# Patient Record
Sex: FEMALE | Race: WHITE | NOT HISPANIC OR LATINO | Employment: FULL TIME | URBAN - METROPOLITAN AREA
[De-identification: names, ages, dates, MRNs, and addresses within clinical notes are randomized per-mention and may not be internally consistent; named-entity substitution may affect disease eponyms.]

---

## 2018-03-16 ENCOUNTER — OFFICE VISIT (OUTPATIENT)
Dept: URGENT CARE | Facility: CLINIC | Age: 23
End: 2018-03-16
Payer: COMMERCIAL

## 2018-03-16 VITALS
SYSTOLIC BLOOD PRESSURE: 129 MMHG | HEART RATE: 86 BPM | OXYGEN SATURATION: 100 % | HEIGHT: 65 IN | RESPIRATION RATE: 16 BRPM | BODY MASS INDEX: 24.99 KG/M2 | WEIGHT: 150 LBS | DIASTOLIC BLOOD PRESSURE: 71 MMHG | TEMPERATURE: 97 F

## 2018-03-16 DIAGNOSIS — H66.001 ACUTE SUPPURATIVE OTITIS MEDIA OF RIGHT EAR WITHOUT SPONTANEOUS RUPTURE OF TYMPANIC MEMBRANE, RECURRENCE NOT SPECIFIED: Primary | ICD-10-CM

## 2018-03-16 PROCEDURE — 99203 OFFICE O/P NEW LOW 30 MIN: CPT | Mod: S$GLB,,, | Performed by: FAMILY MEDICINE

## 2018-03-16 RX ORDER — FLUTICASONE PROPIONATE 50 MCG
1 SPRAY, SUSPENSION (ML) NASAL 2 TIMES DAILY
Qty: 1 BOTTLE | Refills: 2 | Status: ON HOLD | OUTPATIENT
Start: 2018-03-16 | End: 2019-11-06

## 2018-03-16 RX ORDER — AMOXICILLIN AND CLAVULANATE POTASSIUM 875; 125 MG/1; MG/1
1 TABLET, FILM COATED ORAL 2 TIMES DAILY
Qty: 20 TABLET | Refills: 0 | Status: SHIPPED | OUTPATIENT
Start: 2018-03-16 | End: 2018-03-26

## 2018-03-16 NOTE — PROGRESS NOTES
"Subjective:       Patient ID: Tavo Linn is a 22 y.o. female.    Vitals:  height is 5' 5" (1.651 m) and weight is 68 kg (150 lb). Her temperature is 97.3 °F (36.3 °C). Her blood pressure is 129/71 and her pulse is 86. Her respiration is 16 and oxygen saturation is 100%.     Chief Complaint: Sinus Problem; Nasal Congestion; and Sore Throat    Sinus Problem   This is a new problem. The current episode started in the past 7 days. The problem has been gradually worsening since onset. Associated symptoms include congestion and a sore throat. Pertinent negatives include no chills, coughing, ear pain, headaches, hoarse voice or shortness of breath.   Sore Throat    Associated symptoms include congestion. Pertinent negatives include no abdominal pain, coughing, ear pain, headaches, hoarse voice or shortness of breath.     Review of Systems   Constitution: Negative for chills, fever and malaise/fatigue.   HENT: Positive for congestion and sore throat. Negative for ear pain and hoarse voice.    Eyes: Negative for discharge and redness.   Cardiovascular: Negative for chest pain, dyspnea on exertion and leg swelling.   Respiratory: Negative for cough, shortness of breath, sputum production and wheezing.    Musculoskeletal: Negative for myalgias.   Gastrointestinal: Negative for abdominal pain and nausea.   Neurological: Negative for headaches.       Objective:      Physical Exam   Constitutional: She is oriented to person, place, and time. She appears well-developed and well-nourished. She is cooperative.  Non-toxic appearance. She does not appear ill. No distress.   HENT:   Head: Normocephalic and atraumatic.   Right Ear: External ear and ear canal normal. Tympanic membrane is bulging. A middle ear effusion is present. Decreased hearing is noted.   Left Ear: Hearing, tympanic membrane, external ear and ear canal normal.   Nose: Nose normal. No mucosal edema, rhinorrhea or nasal deformity. No epistaxis. Right sinus " exhibits no maxillary sinus tenderness and no frontal sinus tenderness. Left sinus exhibits no maxillary sinus tenderness and no frontal sinus tenderness.   Mouth/Throat: Uvula is midline, oropharynx is clear and moist and mucous membranes are normal. No trismus in the jaw. Normal dentition. No uvula swelling. No posterior oropharyngeal erythema.   Eyes: Conjunctivae and lids are normal. No scleral icterus.   Sclera clear bilat   Neck: Trachea normal, full passive range of motion without pain and phonation normal. Neck supple.   Cardiovascular: Normal rate, regular rhythm, normal heart sounds, intact distal pulses and normal pulses.    Pulmonary/Chest: Effort normal and breath sounds normal. No respiratory distress.   Abdominal: Soft. Normal appearance and bowel sounds are normal. She exhibits no distension. There is no tenderness.   Musculoskeletal: Normal range of motion. She exhibits no edema or deformity.   Neurological: She is alert and oriented to person, place, and time. She exhibits normal muscle tone. Coordination normal.   Skin: Skin is warm, dry and intact. She is not diaphoretic. No pallor.   Psychiatric: She has a normal mood and affect. Her speech is normal and behavior is normal. Judgment and thought content normal. Cognition and memory are normal.   Nursing note and vitals reviewed.      Assessment:       1. Acute suppurative otitis media of right ear without spontaneous rupture of tympanic membrane, recurrence not specified        Plan:         Acute suppurative otitis media of right ear without spontaneous rupture of tympanic membrane, recurrence not specified  -     Ambulatory referral to ENT    Other orders  -     amoxicillin-clavulanate 875-125mg (AUGMENTIN) 875-125 mg per tablet; Take 1 tablet by mouth 2 (two) times daily.  Dispense: 20 tablet; Refill: 0  -     fluticasone (FLONASE) 50 mcg/actuation nasal spray; 1 spray (50 mcg total) by Each Nare route 2 (two) times daily.  Dispense: 1 Bottle;  Refill: 2

## 2018-03-22 ENCOUNTER — TELEPHONE (OUTPATIENT)
Dept: URGENT CARE | Facility: CLINIC | Age: 23
End: 2018-03-22

## 2018-04-03 ENCOUNTER — TELEPHONE (OUTPATIENT)
Dept: URGENT CARE | Facility: CLINIC | Age: 23
End: 2018-04-03

## 2018-04-03 NOTE — TELEPHONE ENCOUNTER
Pt stated was not told of follow up appt with ENT, verified and given number to ENT that has been scheduled

## 2018-04-10 DIAGNOSIS — H65.00 ACUTE SEROUS OTITIS MEDIA, RECURRENCE NOT SPECIFIED, UNSPECIFIED LATERALITY: Primary | ICD-10-CM

## 2018-04-11 ENCOUNTER — TELEPHONE (OUTPATIENT)
Dept: OTOLARYNGOLOGY | Facility: CLINIC | Age: 23
End: 2018-04-11

## 2018-04-11 ENCOUNTER — OFFICE VISIT (OUTPATIENT)
Dept: OTOLARYNGOLOGY | Facility: CLINIC | Age: 23
End: 2018-04-11
Payer: COMMERCIAL

## 2018-04-11 ENCOUNTER — CLINICAL SUPPORT (OUTPATIENT)
Dept: AUDIOLOGY | Facility: CLINIC | Age: 23
End: 2018-04-11
Payer: MEDICAID

## 2018-04-11 VITALS — WEIGHT: 128.31 LBS | BODY MASS INDEX: 21.38 KG/M2 | HEIGHT: 65 IN

## 2018-04-11 DIAGNOSIS — H90.11 CONDUCTIVE HEARING LOSS OF RIGHT EAR WITH UNRESTRICTED HEARING OF LEFT EAR: Primary | ICD-10-CM

## 2018-04-11 DIAGNOSIS — H72.91 PERFORATION OF RIGHT TYMPANIC MEMBRANE: ICD-10-CM

## 2018-04-11 DIAGNOSIS — H92.11 PURULENT OTORRHEA, RIGHT: Primary | ICD-10-CM

## 2018-04-11 DIAGNOSIS — H90.11 CONDUCTIVE HEARING LOSS OF RIGHT EAR WITH UNRESTRICTED HEARING OF LEFT EAR: ICD-10-CM

## 2018-04-11 DIAGNOSIS — H71.91 CHOLESTEATOMA, RIGHT: ICD-10-CM

## 2018-04-11 PROCEDURE — 87077 CULTURE AEROBIC IDENTIFY: CPT

## 2018-04-11 PROCEDURE — 99999 PR PBB SHADOW E&M-EST. PATIENT-LVL I: CPT | Mod: PBBFAC,,,

## 2018-04-11 PROCEDURE — 99999 PR PBB SHADOW E&M-EST. PATIENT-LVL III: CPT | Mod: PBBFAC,,, | Performed by: NURSE PRACTITIONER

## 2018-04-11 PROCEDURE — 99213 OFFICE O/P EST LOW 20 MIN: CPT | Mod: PBBFAC,25,27,PO | Performed by: NURSE PRACTITIONER

## 2018-04-11 PROCEDURE — G0268 REMOVAL OF IMPACTED WAX MD: HCPCS | Mod: PBBFAC,PO,RT | Performed by: NURSE PRACTITIONER

## 2018-04-11 PROCEDURE — 69210 REMOVE IMPACTED EAR WAX UNI: CPT | Mod: S$PBB,,, | Performed by: NURSE PRACTITIONER

## 2018-04-11 PROCEDURE — 92567 TYMPANOMETRY: CPT | Mod: PBBFAC,PO | Performed by: AUDIOLOGIST

## 2018-04-11 PROCEDURE — 87070 CULTURE OTHR SPECIMN AEROBIC: CPT

## 2018-04-11 PROCEDURE — 87186 SC STD MICRODIL/AGAR DIL: CPT

## 2018-04-11 PROCEDURE — 99211 OFF/OP EST MAY X REQ PHY/QHP: CPT | Mod: PBBFAC,PO,25

## 2018-04-11 PROCEDURE — 99203 OFFICE O/P NEW LOW 30 MIN: CPT | Mod: 25,S$PBB,, | Performed by: NURSE PRACTITIONER

## 2018-04-11 PROCEDURE — 92557 COMPREHENSIVE HEARING TEST: CPT | Mod: PBBFAC,PO | Performed by: AUDIOLOGIST

## 2018-04-11 RX ORDER — CIPROFLOXACIN HYDROCHLORIDE 3 MG/ML
SOLUTION/ DROPS OPHTHALMIC
Qty: 10 ML | Refills: 0 | Status: SHIPPED | OUTPATIENT
Start: 2018-04-11 | End: 2018-05-03

## 2018-04-11 RX ORDER — CIPROFLOXACIN AND DEXAMETHASONE 3; 1 MG/ML; MG/ML
5 SUSPENSION/ DROPS AURICULAR (OTIC) 2 TIMES DAILY
Qty: 7.5 ML | Refills: 0 | Status: SHIPPED | OUTPATIENT
Start: 2018-04-11 | End: 2018-04-11

## 2018-04-11 NOTE — LETTER
April 11, 2018      Ryan Miranda MD  [address]  [facility] [address]           [place] - ENT  [address] [facility] [address] [place] [address]  Phone: [phone]  Fax: [phone]          Patient: Tavo Linn   MR Number: [id number]   YOB: 1995   Date of Visit: 4/11/2018       Dear Dr. Ryan Miranda:    Thank you for referring Tavo Linn to me for evaluation. Attached you will find relevant portions of my assessment and plan of care.    If you have questions, please do not hesitate to call me. I look forward to following Tavo Linn along with you.    Sincerely,    Emma Shi, SLY    Enclosure  CC:  No Recipients    If you would like to receive this communication electronically, please contact [email or web address] or [phone] to request more information on [employer] access.    For providers and/or their staff who would like to refer a patient to [facility], please contact us through our one-stop-shop provider referral line, [facility], at [phone].    If you feel you have received this communication in error or would no longer like to receive these types of communications, please e-mail [email or web address]

## 2018-04-11 NOTE — PROGRESS NOTES
Subjective:       Patient ID: Tavo Linn is a 22 y.o. female.    Chief Complaint: Otitis Media (rt ear) and Ear Drainage    HPI   Patient experienced rupture of right TM at age 5. Tympanoplasty done by Dr. Lewis García in [place] in 2000, unsuccessful. Patient states her right ear has been bleeding with malodorous discolored drainage ever since, X 18 years, continuous. She states she does not hear well AD. She has been treated with numerous antibiotics, both oral and ototopical, without resolution. She has had no further ear surgeries. It does not hurt, just odor and drainage.     Review of Systems   Constitutional: Negative.    HENT: Positive for ear discharge and hearing loss. Negative for ear pain.    Eyes: Negative.    Respiratory: Negative.    Cardiovascular: Negative.    Gastrointestinal: Negative.    Musculoskeletal: Negative.    Skin: Negative.    Neurological: Negative.    Hematological: Negative.    Psychiatric/Behavioral: Negative.        Objective:      Physical Exam   Constitutional: She is oriented to person, place, and time. Vital signs are normal. She appears well-developed and well-nourished. She is cooperative. She does not appear ill. No distress.   HENT:   Head: Normocephalic and atraumatic.   Right Ear: External ear and ear canal normal. There is drainage, swelling and tenderness. Tympanic membrane is perforated and erythematous. A middle ear effusion is present. Decreased hearing is noted.   Left Ear: Hearing, tympanic membrane, external ear and ear canal normal. Tympanic membrane is not erythematous.  No middle ear effusion.   Ears:    Nose: Nose normal. No mucosal edema or rhinorrhea. Right sinus exhibits no maxillary sinus tenderness and no frontal sinus tenderness. Left sinus exhibits no maxillary sinus tenderness and no frontal sinus tenderness.   Mouth/Throat: Uvula is midline, oropharynx is clear and moist and mucous membranes are normal. Mucous membranes are not pale, not dry  and not cyanotic. No oral lesions. No oropharyngeal exudate, posterior oropharyngeal edema or posterior oropharyngeal erythema.     SEPARATE PROCEDURE IN OFFICE:   Procedure: Removal of impacted cerumen, RIGHT   Pre Procedure Diagnosis: Cerumen Impaction   Post Procedure Diagnosis: Cerumen Impaction   Verbal informed consent in regards to risk of trauma to ear canal, ear drum or hearing, discomfort during procedure and/or inability to remove cerumen impaction in one session or unforeseen events or complications.   No anesthesia.     Procedure in detail:   Ear canal visualized bilateral with appropriate size ear speculum utilizing Operating Head Binocular Otomicroscope   Utilizing the following: Suction cannula AD. The impacted cerumen of the ear canals was removed atraumatically. The TM and EAC were then inspected and found to be clear of wax. See description of TMs/EACs in PE above.   Complications: No   Condition: Improved/Good     Eyes: Conjunctivae, EOM and lids are normal. Pupils are equal, round, and reactive to light. Right eye exhibits no discharge. Left eye exhibits no discharge. No scleral icterus.   Neck: Trachea normal and normal range of motion. Neck supple. No tracheal deviation present. No thyroid mass and no thyromegaly present.   Cardiovascular: Normal rate.    Pulmonary/Chest: Effort normal. No stridor. No respiratory distress. She has no wheezes.   Musculoskeletal: Normal range of motion.   Lymphadenopathy:        Head (right side): No submental, no submandibular, no tonsillar, no preauricular and no posterior auricular adenopathy present.        Head (left side): No submental, no submandibular, no tonsillar, no preauricular and no posterior auricular adenopathy present.     She has no cervical adenopathy.        Right cervical: No superficial cervical and no posterior cervical adenopathy present.       Left cervical: No superficial cervical and no posterior cervical adenopathy present.    Neurological: She is alert and oriented to person, place, and time. She has normal strength. Coordination and gait normal.   Skin: Skin is warm, dry and intact. No lesion and no rash noted. She is not diaphoretic. No cyanosis. No pallor.   Psychiatric: She has a normal mood and affect. Her speech is normal and behavior is normal. Judgment and thought content normal. Cognition and memory are normal.   Nursing note and vitals reviewed.      Assessment:       1. Purulent otorrhea, right    2. Perforation of right tympanic membrane     3.  Cholesteatoma AD  4.  Moderate to moderately severe conductive hearing loss AD  Plan:    Ciprodex gtts AD BID X 2 weeks.      CT temporal bones  F/u with Dr. Rajan in 2 weeks after CT t-bone

## 2018-04-11 NOTE — TELEPHONE ENCOUNTER
----- Message from Sharifa Pastor sent at 4/11/2018 12:51 PM CDT -----  Please call mom, , in regards to ear drop Rx not being covered & they're to expensive, [phone]

## 2018-04-16 LAB — BACTERIA SPEC AEROBE CULT: NORMAL

## 2018-04-20 ENCOUNTER — TELEPHONE (OUTPATIENT)
Dept: OTOLARYNGOLOGY | Facility: CLINIC | Age: 23
End: 2018-04-20

## 2018-04-20 NOTE — TELEPHONE ENCOUNTER
----- Message from Emma Shi NP sent at 4/16/2018  3:21 PM CDT -----  MRSA growing from ear. The Ciprodex should help if done as demonstrated in ENT office, but call back if not getting better. Keep CT appt for next week and f/u with Dr. Rajan.

## 2018-04-25 ENCOUNTER — TELEPHONE (OUTPATIENT)
Dept: OTOLARYNGOLOGY | Facility: CLINIC | Age: 23
End: 2018-04-25

## 2018-04-25 ENCOUNTER — HOSPITAL ENCOUNTER (OUTPATIENT)
Dept: RADIOLOGY | Facility: HOSPITAL | Age: 23
Discharge: HOME | End: 2018-04-25
Attending: NURSE PRACTITIONER
Payer: MEDICAID

## 2018-04-25 ENCOUNTER — OFFICE VISIT (OUTPATIENT)
Dept: OTOLARYNGOLOGY | Facility: CLINIC | Age: 23
End: 2018-04-25
Payer: MEDICAID

## 2018-04-25 VITALS — BODY MASS INDEX: 22.02 KG/M2 | HEIGHT: 64 IN | WEIGHT: 129 LBS

## 2018-04-25 DIAGNOSIS — H71.91 CHOLESTEATOMA, RIGHT: ICD-10-CM

## 2018-04-25 DIAGNOSIS — H72.91 PERFORATION OF RIGHT TYMPANIC MEMBRANE: ICD-10-CM

## 2018-04-25 DIAGNOSIS — H72.91 PERFORATION OF RIGHT TYMPANIC MEMBRANE: Primary | ICD-10-CM

## 2018-04-25 DIAGNOSIS — H92.11 PURULENT OTORRHEA, RIGHT: ICD-10-CM

## 2018-04-25 PROCEDURE — 70480 CT ORBIT/EAR/FOSSA W/O DYE: CPT | Mod: 26,,, | Performed by: RADIOLOGY

## 2018-04-25 PROCEDURE — 99213 OFFICE O/P EST LOW 20 MIN: CPT | Mod: PBBFAC,25,PO | Performed by: OTOLARYNGOLOGY

## 2018-04-25 PROCEDURE — 70480 CT ORBIT/EAR/FOSSA W/O DYE: CPT | Mod: TC,PO

## 2018-04-25 PROCEDURE — 99999 PR PBB SHADOW E&M-EST. PATIENT-LVL III: CPT | Mod: PBBFAC,,, | Performed by: OTOLARYNGOLOGY

## 2018-04-25 PROCEDURE — 99214 OFFICE O/P EST MOD 30 MIN: CPT | Mod: S$PBB,,, | Performed by: OTOLARYNGOLOGY

## 2018-04-25 NOTE — PROGRESS NOTES
Subjective:       Patient ID: Tavo Linn is a 22 y.o. female.    Chief Complaint: review CT scan    Tavo is here for follow-up of right tm perforation. Otorrhea improved, hearing still down. No vertigo. No facial weakness.    Previous note by MB  Tympanoplasty done by Dr. Lewis García in [place] in 2000, unsuccessful. Patient states her right ear has been bleeding with malodorous discolored drainage ever since, X 18 years, continuous. She states she does not hear well AD. She has been treated with numerous antibiotics, both oral and ototopical, without resolution. She has had no further ear surgeries. It does not hurt, just odor and drainage.     Review of Systems   Constitutional: Negative for activity change and appetite change.   Respiratory: Negative for difficulty breathing and wheezing   Cardiovascular: Negative for chest pain.      Objective:        Constitutional:   Vital signs are normal. She appears well-developed and well-nourished.     Head:  Normocephalic and atraumatic.     Ears:  Left ear hearing normal to normal and whispered voice; external ear normal without scars, lesions, or masses; ear canal, tympanic membrane, and middle ear normal..   Right Ear: Tympanic membrane is perforated (near total). No middle ear effusion.   Cholesteatoma in superior mesotympanum / epitympanum .small mastoid cavity with normal sized ear canal.   No purulence  Inflammation of middle ear mucosa          Tests / Results:  I personally reviewed the Ct temporal bone and my findings reveal: mesotympanic / epitympanic thickening concerning for cholesteatoma, previous partial CWD mastoid vs. Bony overgrowth but Posterior canal is not intact laterally. FN ? Focal Dehiscence in middle ear. No involvement of SCC. No ossicles visualized.    Assessment:       1. Perforation of right tympanic membrane    2. Cholesteatoma, right          Plan:         Infection slightly improved but with concern for cholesteatoma. She  had a CWD or violated posterior canal wall in the past with inadequate meatoplasty. Discussed right CWD mastoidectomy with TM grafting and likely delayed OCR.     The diagnosis, details of the surgery, postoperative care, and options of treatment were discussed at length with the patient. The risks of surgery were discussed including but not limited to  infection, bleeding, recurrence, permanent deafness/hearing loss, disabling dizziness, taste disturbances, facial paralysis, need for additional surgery, and cardiopulmonary complications from anesthesia. I answered the patient's questions to satisfaction.

## 2018-04-25 NOTE — TELEPHONE ENCOUNTER
----- Message from Freya Bennett sent at 4/25/2018 11:03 AM CDT -----  Type: Needs Medical Advice    Who Called: pt  Symptoms (please be specific):  loulou  How long has patient had these symptoms: loulou  Pharmacy name and phone #:  loulou  Best Call Back Number [phone]  Additional Information: pt is  Calling to see if  Ear  Drops  Can  Be  Continued  Until    Surgery?

## 2018-05-03 ENCOUNTER — TELEPHONE (OUTPATIENT)
Dept: SURGERY | Facility: HOSPITAL | Age: 23
End: 2018-05-03

## 2018-05-03 ENCOUNTER — ANESTHESIA EVENT (OUTPATIENT)
Dept: SURGERY | Facility: HOSPITAL | Age: 23
End: 2018-05-03
Payer: MEDICAID

## 2018-05-04 ENCOUNTER — SURGERY (OUTPATIENT)
Age: 23
End: 2018-05-04

## 2018-05-04 ENCOUNTER — HOSPITAL ENCOUNTER (OUTPATIENT)
Facility: HOSPITAL | Age: 23
Discharge: HOME | End: 2018-05-04
Attending: OTOLARYNGOLOGY | Admitting: OTOLARYNGOLOGY
Payer: MEDICAID

## 2018-05-04 ENCOUNTER — ANESTHESIA (OUTPATIENT)
Dept: SURGERY | Facility: HOSPITAL | Age: 23
End: 2018-05-04
Payer: MEDICAID

## 2018-05-04 VITALS
SYSTOLIC BLOOD PRESSURE: 137 MMHG | RESPIRATION RATE: 14 BRPM | DIASTOLIC BLOOD PRESSURE: 82 MMHG | HEIGHT: 66 IN | HEART RATE: 75 BPM | BODY MASS INDEX: 20.89 KG/M2 | OXYGEN SATURATION: 99 % | WEIGHT: 130 LBS | TEMPERATURE: 97 F

## 2018-05-04 DIAGNOSIS — H71.91 CHOLESTEATOMA OF EAR, RIGHT: Primary | ICD-10-CM

## 2018-05-04 LAB
B-HCG UR QL: NEGATIVE
CTP QC/QA: YES

## 2018-05-04 PROCEDURE — 63600175 PHARM REV CODE 636 W HCPCS: Mod: PO | Performed by: ANESTHESIOLOGY

## 2018-05-04 PROCEDURE — 63600175 PHARM REV CODE 636 W HCPCS: Mod: PO | Performed by: NURSE ANESTHETIST, CERTIFIED REGISTERED

## 2018-05-04 PROCEDURE — 00300 ANES ALL PX INTEG H/N/PTRUNK: CPT | Mod: PO | Performed by: OTOLARYNGOLOGY

## 2018-05-04 PROCEDURE — S0028 INJECTION, FAMOTIDINE, 20 MG: HCPCS | Mod: PO | Performed by: NURSE ANESTHETIST, CERTIFIED REGISTERED

## 2018-05-04 PROCEDURE — 27201423 OPTIME MED/SURG SUP & DEVICES STERILE SUPPLY: Mod: PO | Performed by: OTOLARYNGOLOGY

## 2018-05-04 PROCEDURE — 25000003 PHARM REV CODE 250: Mod: PO | Performed by: ANESTHESIOLOGY

## 2018-05-04 PROCEDURE — 37000008 HC ANESTHESIA 1ST 15 MINUTES: Mod: PO | Performed by: OTOLARYNGOLOGY

## 2018-05-04 PROCEDURE — 81025 URINE PREGNANCY TEST: CPT | Mod: PO | Performed by: OTOLARYNGOLOGY

## 2018-05-04 PROCEDURE — 63600175 PHARM REV CODE 636 W HCPCS: Mod: PO | Performed by: OTOLARYNGOLOGY

## 2018-05-04 PROCEDURE — D9220A PRA ANESTHESIA: Mod: CRNA,,, | Performed by: NURSE ANESTHETIST, CERTIFIED REGISTERED

## 2018-05-04 PROCEDURE — 36000708 HC OR TIME LEV III 1ST 15 MIN: Mod: PO | Performed by: OTOLARYNGOLOGY

## 2018-05-04 PROCEDURE — 71000039 HC RECOVERY, EACH ADD'L HOUR: Mod: PO | Performed by: OTOLARYNGOLOGY

## 2018-05-04 PROCEDURE — 71000033 HC RECOVERY, INTIAL HOUR: Mod: PO | Performed by: OTOLARYNGOLOGY

## 2018-05-04 PROCEDURE — 88305 TISSUE EXAM BY PATHOLOGIST: CPT | Mod: 26,,, | Performed by: PATHOLOGY

## 2018-05-04 PROCEDURE — 88305 TISSUE EXAM BY PATHOLOGIST: CPT | Performed by: PATHOLOGY

## 2018-05-04 PROCEDURE — 25000003 PHARM REV CODE 250: Mod: PO | Performed by: NURSE ANESTHETIST, CERTIFIED REGISTERED

## 2018-05-04 PROCEDURE — 36000709 HC OR TIME LEV III EA ADD 15 MIN: Mod: PO | Performed by: OTOLARYNGOLOGY

## 2018-05-04 PROCEDURE — 37000009 HC ANESTHESIA EA ADD 15 MINS: Mod: PO | Performed by: OTOLARYNGOLOGY

## 2018-05-04 PROCEDURE — 69645 REVISE MIDDLE EAR & MASTOID: CPT | Mod: RT,,, | Performed by: OTOLARYNGOLOGY

## 2018-05-04 PROCEDURE — D9220A PRA ANESTHESIA: Mod: ANES,,, | Performed by: ANESTHESIOLOGY

## 2018-05-04 PROCEDURE — 25000003 PHARM REV CODE 250: Mod: PO | Performed by: OTOLARYNGOLOGY

## 2018-05-04 RX ORDER — SODIUM CHLORIDE 0.9 % (FLUSH) 0.9 %
3 SYRINGE (ML) INJECTION
Status: DISCONTINUED | OUTPATIENT
Start: 2018-05-04 | End: 2018-05-04 | Stop reason: HOSPADM

## 2018-05-04 RX ORDER — ONDANSETRON 4 MG/1
4 TABLET, ORALLY DISINTEGRATING ORAL EVERY 6 HOURS PRN
Qty: 10 TABLET | Refills: 0 | Status: ON HOLD | OUTPATIENT
Start: 2018-05-04 | End: 2018-10-22 | Stop reason: CLARIF

## 2018-05-04 RX ORDER — OXYCODONE HYDROCHLORIDE 5 MG/1
5 TABLET ORAL ONCE AS NEEDED
Status: COMPLETED | OUTPATIENT
Start: 2018-05-04 | End: 2018-05-04

## 2018-05-04 RX ORDER — CIPROFLOXACIN AND DEXAMETHASONE 3; 1 MG/ML; MG/ML
SUSPENSION/ DROPS AURICULAR (OTIC)
Status: DISCONTINUED | OUTPATIENT
Start: 2018-05-04 | End: 2018-05-04 | Stop reason: HOSPADM

## 2018-05-04 RX ORDER — DEXAMETHASONE SODIUM PHOSPHATE 4 MG/ML
8 INJECTION, SOLUTION INTRA-ARTICULAR; INTRALESIONAL; INTRAMUSCULAR; INTRAVENOUS; SOFT TISSUE
Status: COMPLETED | OUTPATIENT
Start: 2018-05-04 | End: 2018-05-04

## 2018-05-04 RX ORDER — LIDOCAINE HCL/PF 100 MG/5ML
SYRINGE (ML) INTRAVENOUS
Status: DISCONTINUED | OUTPATIENT
Start: 2018-05-04 | End: 2018-05-04

## 2018-05-04 RX ORDER — MIDAZOLAM HYDROCHLORIDE 1 MG/ML
INJECTION, SOLUTION INTRAMUSCULAR; INTRAVENOUS
Status: DISCONTINUED | OUTPATIENT
Start: 2018-05-04 | End: 2018-05-04

## 2018-05-04 RX ORDER — METOCLOPRAMIDE HYDROCHLORIDE 5 MG/ML
INJECTION INTRAMUSCULAR; INTRAVENOUS
Status: DISCONTINUED | OUTPATIENT
Start: 2018-05-04 | End: 2018-05-04

## 2018-05-04 RX ORDER — MUPIROCIN 20 MG/G
OINTMENT TOPICAL
Status: DISCONTINUED | OUTPATIENT
Start: 2018-05-04 | End: 2018-05-04 | Stop reason: HOSPADM

## 2018-05-04 RX ORDER — SCOLOPAMINE TRANSDERMAL SYSTEM 1 MG/1
1 PATCH, EXTENDED RELEASE TRANSDERMAL
Status: COMPLETED | OUTPATIENT
Start: 2018-05-04 | End: 2018-05-04

## 2018-05-04 RX ORDER — SODIUM CHLORIDE, SODIUM LACTATE, POTASSIUM CHLORIDE, CALCIUM CHLORIDE 600; 310; 30; 20 MG/100ML; MG/100ML; MG/100ML; MG/100ML
INJECTION, SOLUTION INTRAVENOUS CONTINUOUS
Status: DISCONTINUED | OUTPATIENT
Start: 2018-05-04 | End: 2018-05-04 | Stop reason: HOSPADM

## 2018-05-04 RX ORDER — OXYCODONE AND ACETAMINOPHEN 5; 325 MG/1; MG/1
1 TABLET ORAL EVERY 4 HOURS PRN
Qty: 20 TABLET | Refills: 0 | Status: ON HOLD | OUTPATIENT
Start: 2018-05-04 | End: 2018-10-22 | Stop reason: CLARIF

## 2018-05-04 RX ORDER — PROPOFOL 10 MG/ML
VIAL (ML) INTRAVENOUS
Status: DISCONTINUED | OUTPATIENT
Start: 2018-05-04 | End: 2018-05-04

## 2018-05-04 RX ORDER — LIDOCAINE HYDROCHLORIDE AND EPINEPHRINE 10; 10 MG/ML; UG/ML
INJECTION, SOLUTION INFILTRATION; PERINEURAL
Status: DISCONTINUED | OUTPATIENT
Start: 2018-05-04 | End: 2018-05-04 | Stop reason: HOSPADM

## 2018-05-04 RX ORDER — ONDANSETRON 2 MG/ML
4 INJECTION INTRAMUSCULAR; INTRAVENOUS DAILY PRN
Status: DISCONTINUED | OUTPATIENT
Start: 2018-05-04 | End: 2018-05-04 | Stop reason: HOSPADM

## 2018-05-04 RX ORDER — SUCCINYLCHOLINE CHLORIDE 20 MG/ML
INJECTION INTRAMUSCULAR; INTRAVENOUS
Status: DISCONTINUED | OUTPATIENT
Start: 2018-05-04 | End: 2018-05-04

## 2018-05-04 RX ORDER — CEFAZOLIN SODIUM 1 G/3ML
2 INJECTION, POWDER, FOR SOLUTION INTRAMUSCULAR; INTRAVENOUS
Status: DISCONTINUED | OUTPATIENT
Start: 2018-05-04 | End: 2018-05-04 | Stop reason: HOSPADM

## 2018-05-04 RX ORDER — FAMOTIDINE 10 MG/ML
INJECTION INTRAVENOUS
Status: DISCONTINUED | OUTPATIENT
Start: 2018-05-04 | End: 2018-05-04

## 2018-05-04 RX ORDER — FENTANYL CITRATE 50 UG/ML
25 INJECTION, SOLUTION INTRAMUSCULAR; INTRAVENOUS EVERY 5 MIN PRN
Status: DISCONTINUED | OUTPATIENT
Start: 2018-05-04 | End: 2018-05-04 | Stop reason: HOSPADM

## 2018-05-04 RX ORDER — CEFAZOLIN SODIUM 1 G/3ML
INJECTION, POWDER, FOR SOLUTION INTRAMUSCULAR; INTRAVENOUS
Status: DISCONTINUED | OUTPATIENT
Start: 2018-05-04 | End: 2018-05-04

## 2018-05-04 RX ORDER — LIDOCAINE HYDROCHLORIDE 10 MG/ML
0.5 INJECTION, SOLUTION EPIDURAL; INFILTRATION; INTRACAUDAL; PERINEURAL ONCE AS NEEDED
Status: DISCONTINUED | OUTPATIENT
Start: 2018-05-04 | End: 2018-05-04 | Stop reason: HOSPADM

## 2018-05-04 RX ORDER — ROCURONIUM BROMIDE 10 MG/ML
INJECTION, SOLUTION INTRAVENOUS
Status: DISCONTINUED | OUTPATIENT
Start: 2018-05-04 | End: 2018-05-04

## 2018-05-04 RX ORDER — BACITRACIN ZINC 500 UNIT/G
OINTMENT (GRAM) TOPICAL
Status: DISCONTINUED | OUTPATIENT
Start: 2018-05-04 | End: 2018-05-04 | Stop reason: HOSPADM

## 2018-05-04 RX ORDER — ACETAMINOPHEN 10 MG/ML
INJECTION, SOLUTION INTRAVENOUS
Status: DISCONTINUED | OUTPATIENT
Start: 2018-05-04 | End: 2018-05-04

## 2018-05-04 RX ORDER — ONDANSETRON 2 MG/ML
INJECTION INTRAMUSCULAR; INTRAVENOUS
Status: DISCONTINUED | OUTPATIENT
Start: 2018-05-04 | End: 2018-05-04

## 2018-05-04 RX ORDER — SCOLOPAMINE TRANSDERMAL SYSTEM 1 MG/1
1 PATCH, EXTENDED RELEASE TRANSDERMAL ONCE
Status: DISCONTINUED | OUTPATIENT
Start: 2018-05-04 | End: 2018-05-04

## 2018-05-04 RX ORDER — CEPHALEXIN 500 MG/1
500 CAPSULE ORAL EVERY 8 HOURS
Qty: 21 CAPSULE | Refills: 0 | Status: SHIPPED | OUTPATIENT
Start: 2018-05-04 | End: 2018-05-11

## 2018-05-04 RX ORDER — EPINEPHRINE 1 MG/ML
INJECTION INTRAMUSCULAR; INTRAVENOUS; SUBCUTANEOUS
Status: DISCONTINUED | OUTPATIENT
Start: 2018-05-04 | End: 2018-05-04 | Stop reason: HOSPADM

## 2018-05-04 RX ORDER — LORAZEPAM 2 MG/ML
0.25 INJECTION INTRAMUSCULAR
Status: DISCONTINUED | OUTPATIENT
Start: 2018-05-04 | End: 2018-05-04 | Stop reason: HOSPADM

## 2018-05-04 RX ORDER — FENTANYL CITRATE 50 UG/ML
INJECTION, SOLUTION INTRAMUSCULAR; INTRAVENOUS
Status: DISCONTINUED | OUTPATIENT
Start: 2018-05-04 | End: 2018-05-04

## 2018-05-04 RX ADMIN — FENTANYL CITRATE 25 MCG: 50 INJECTION, SOLUTION INTRAMUSCULAR; INTRAVENOUS at 09:05

## 2018-05-04 RX ADMIN — SODIUM CHLORIDE, SODIUM LACTATE, POTASSIUM CHLORIDE, CALCIUM CHLORIDE: 600; 310; 30; 20 INJECTION, SOLUTION INTRAVENOUS at 07:05

## 2018-05-04 RX ADMIN — FENTANYL CITRATE 50 MCG: 50 INJECTION, SOLUTION INTRAMUSCULAR; INTRAVENOUS at 07:05

## 2018-05-04 RX ADMIN — EPINEPHRINE 30 MG: 1 INJECTION, SOLUTION INTRAMUSCULAR; INTRAVENOUS; SUBCUTANEOUS at 08:05

## 2018-05-04 RX ADMIN — DEXAMETHASONE SODIUM PHOSPHATE 8 MG: 4 INJECTION, SOLUTION INTRAMUSCULAR; INTRAVENOUS at 07:05

## 2018-05-04 RX ADMIN — CEFAZOLIN 2 G: 1 INJECTION, POWDER, FOR SOLUTION INTRAVENOUS at 07:05

## 2018-05-04 RX ADMIN — LIDOCAINE HYDROCHLORIDE AND EPINEPHRINE 5 ML: 10; 10 INJECTION, SOLUTION INFILTRATION; PERINEURAL at 08:05

## 2018-05-04 RX ADMIN — GELATIN ABSORBABLE SPONGE 12-7 MM 1 EACH: 12-7 MISC at 08:05

## 2018-05-04 RX ADMIN — METOCLOPRAMIDE 10 MG: 5 INJECTION, SOLUTION INTRAMUSCULAR; INTRAVENOUS at 09:05

## 2018-05-04 RX ADMIN — PROPOFOL 200 MG: 10 INJECTION, EMULSION INTRAVENOUS at 07:05

## 2018-05-04 RX ADMIN — ROCURONIUM BROMIDE 10 MG: 10 INJECTION, SOLUTION INTRAVENOUS at 07:05

## 2018-05-04 RX ADMIN — CIPROFLOXACIN AND DEXAMETHASONE 4 DROP: 3; 1 SUSPENSION/ DROPS AURICULAR (OTIC) at 08:05

## 2018-05-04 RX ADMIN — LIDOCAINE HYDROCHLORIDE 80 MG: 20 INJECTION PARENTERAL at 07:05

## 2018-05-04 RX ADMIN — ONDANSETRON 4 MG: 2 INJECTION, SOLUTION INTRAMUSCULAR; INTRAVENOUS at 11:05

## 2018-05-04 RX ADMIN — ACETAMINOPHEN 1000 MG: 10 INJECTION, SOLUTION INTRAVENOUS at 07:05

## 2018-05-04 RX ADMIN — SUCCINYLCHOLINE CHLORIDE 120 MG: 20 INJECTION, SOLUTION INTRAMUSCULAR; INTRAVENOUS at 07:05

## 2018-05-04 RX ADMIN — FENTANYL CITRATE 50 MCG: 50 INJECTION, SOLUTION INTRAMUSCULAR; INTRAVENOUS at 10:05

## 2018-05-04 RX ADMIN — MUPIROCIN 1 TUBE: 20 OINTMENT TOPICAL at 10:05

## 2018-05-04 RX ADMIN — OXYCODONE HYDROCHLORIDE 5 MG: 5 TABLET ORAL at 01:05

## 2018-05-04 RX ADMIN — SODIUM CHLORIDE, SODIUM LACTATE, POTASSIUM CHLORIDE, CALCIUM CHLORIDE: 600; 310; 30; 20 INJECTION, SOLUTION INTRAVENOUS at 08:05

## 2018-05-04 RX ADMIN — SCOPALAMINE 1 PATCH: 1 PATCH, EXTENDED RELEASE TRANSDERMAL at 12:05

## 2018-05-04 RX ADMIN — MIDAZOLAM HYDROCHLORIDE 2 MG: 1 INJECTION, SOLUTION INTRAMUSCULAR; INTRAVENOUS at 07:05

## 2018-05-04 RX ADMIN — FAMOTIDINE 20 MG: 10 INJECTION INTRAVENOUS at 09:05

## 2018-05-04 RX ADMIN — PROMETHAZINE HYDROCHLORIDE 6.25 MG: 25 INJECTION INTRAMUSCULAR; INTRAVENOUS at 12:05

## 2018-05-04 NOTE — DISCHARGE INSTRUCTIONS
Postoperative Instructions  Chronic Ear SurgeryTympanoplasty, Tympanomastoidectomy, Mastoidectomy  Jamie Rajan MD  Otolarynology  [facility]  [phone]  Cell phone - (after hours / weekends) - [phone]    Leaving the Hospital   You will receive a prescription for pain medicine, an antibiotic, and sometimes an anti-nausea medicine.     Home Care-The First Few Days   First 24 hours after surgery:  o Remove all gauze from around the ear  o Keep the small yellow packing inside the ear until the follow-up. Call me if the pack falls out.  o You can clean the blood around the ear with hydrogen peroxide as needed  You will keep the white ear cover over the ear for 48 hours. OK to take off during shower and brief periods, but it will provide gentle pressure to the area. After 48 hours, you can continue to keep it on as needed for comfort.   It is normal to see blood-tinged or brown drainage from the ear for several days. I recommend buying some 4x4 gauze from a local drug store to replace inside the ear cover as needed.   If the drainage becomes yellow, green, or has a foul smelling odor, please call the office.   The ear canal will be filled with dissolvable packing, which should be left in place.   Do not clean the ear canal with cotton swabs. Clean the outer ear with a soft cloth or cotton swabs to remove dried blood, but if the ear is tender this is not necessary.   You may wash your hair 2 days after surgery but keep all water out of the ear canal.   The incision is closed with Steri Strips that will fall off on their own. No care is required for this.   Do not blow your nose for 4 weeks after surgery. Sniffing is okay.   You may fly 6 weeks following surgery. Whenever you fly, take an over the counter decongestant 30 minutes before take-off. Before the plane begins to descend, spray your nose with Neosynephrine or Afrin nasal spray. Use this procedure whenever you fly in the  future. You may use the same treatment when traveling by car in mountainous region.   No vigorous physical activity, including sports, until seen for your post-operative visit. With exception of these restrictions, you may return to work or school as overall condition permits. After 3 weeks you may resume all activities, including sports and physical exercise.   You may hear a variety of noises in your ear such as cracking or popping. This is part of the normal healing process.   Dizziness or lightheadedness is normal for up to 1 week after surgery.  Home Care- After the First Few Days   Drainage should begin to decrease and pain should also subside. Please continue taking Ibuprofen or Tylenol for relief of mild pain. It is normal for the top ½ of the ear to feel numb and this will take several months to return to normal.   There may be change in taste (usually described as metallic) on one side of the tongue.   All stitches are under the skin and will not need to be removed. After the Steri-strips are removed, the incision should be cleaned gently with peroxide once or twice daily until no cresting is noted. A thin layer of antibiotic ointment (Neosporin, Polysporin, Bacitracin, etc.) is helpful for 7 days after removal of Steri-strips.   Continue to keep all water out of the ear canal.  First Follow-up Appointment   We will set up your follow-up appointment in 1 week  Call office if:   Increased pain not relieved by prescription medications.   Large amounts of bleeding from the ear area.   Pus/Foul smelling drainage from the ear.   Redness in the ear area.   Temperature over 100° on 2 consecutive readings.   Severe dizziness.        Discharge Instructions: After Your Surgery  Youve just had surgery. During surgery, you were given medicine called anesthesia to keep you relaxed and free of pain. After surgery, you may have some pain or nausea. This is common. Here are some tips for feeling better and  getting well after surgery.     Stay on schedule with your medicine.   Going home  Your healthcare provider will show you how to take care of yourself when you go home. He or she will also answer your questions. Have an adult family member or friend drive you home. For the first 24 hours after your surgery:  · Do not drive or use heavy equipment.  · Do not make important decisions or sign legal papers.  · Do not drink alcohol.  · Have someone stay with you, if needed. He or she can watch for problems and help keep you safe.  Be sure to go to all follow-up visits with your healthcare provider. And rest after your surgery for as long as your healthcare provider tells you to.  Coping with pain  If you have pain after surgery, pain medicine will help you feel better. Take it as told, before pain becomes severe. Also, ask your healthcare provider or pharmacist about other ways to control pain. This might be with heat, ice, or relaxation. And follow any other instructions your surgeon or nurse gives you.  Tips for taking pain medicine  To get the best relief possible, remember these points:  · Pain medicines can upset your stomach. Taking them with a little food may help.  · Most pain relievers taken by mouth need at least 20 to 30 minutes to start to work.  · Taking medicine on a schedule can help you remember to take it. Try to time your medicine so that you can take it before starting an activity. This might be before you get dressed, go for a walk, or sit down for dinner.  · Constipation is a common side effect of pain medicines. Call your healthcare provider before taking any medicines such as laxatives or stool softeners to help ease constipation. Also ask if you should skip any foods. Drinking lots of fluids and eating foods such as fruits and vegetables that are high in fiber can also help. Remember, do not take laxatives unless your surgeon has prescribed them.  · Drinking alcohol and taking pain medicine can  cause dizziness and slow your breathing. It can even be deadly. Do not drink alcohol while taking pain medicine.  · Pain medicine can make you react more slowly to things. Do not drive or run machinery while taking pain medicine.  Your healthcare provider may tell you to take acetaminophen to help ease your pain. Ask him or her how much you are supposed to take each day. Acetaminophen or other pain relievers may interact with your prescription medicines or other over-the-counter (OTC) medicines. Some prescription medicines have acetaminophen and other ingredients. Using both prescription and OTC acetaminophen for pain can cause you to overdose. Read the labels on your OTC medicines with care. This will help you to clearly know the list of ingredients, how much to take, and any warnings. It may also help you not take too much acetaminophen. If you have questions or do not understand the information, ask your pharmacist or healthcare provider to explain it to you before you take the OTC medicine.  Managing nausea  Some people have an upset stomach after surgery. This is often because of anesthesia, pain, or pain medicine, or the stress of surgery. These tips will help you handle nausea and eat healthy foods as you get better. If you were on a special food plan before surgery, ask your healthcare provider if you should follow it while you get better. These tips may help:  · Do not push yourself to eat. Your body will tell you when to eat and how much.  · Start off with clear liquids and soup. They are easier to digest.  · Next try semi-solid foods, such as mashed potatoes, applesauce, and gelatin, as you feel ready.  · Slowly move to solid foods. Dont eat fatty, rich, or spicy foods at first.  · Do not force yourself to have 3 large meals a day. Instead eat smaller amounts more often.  · Take pain medicines with a small amount of solid food, such as crackers or toast, to avoid nausea.     Call your surgeon if  · You  still have pain an hour after taking medicine. The medicine may not be strong enough.  · You feel too sleepy, dizzy, or groggy. The medicine may be too strong.  · You have side effects like nausea, vomiting, or skin changes, such as rash, itching, or hives.       If you have obstructive sleep apnea  You were given anesthesia medicine during surgery to keep you comfortable and free of pain. After surgery, you may have more apnea spells because of this medicine and other medicines you were given. The spells may last longer than usual.   At home:  · Keep using the continuous positive airway pressure (CPAP) device when you sleep. Unless your healthcare provider tells you not to, use it when you sleep, day or night. CPAP is a common device used to treat obstructive sleep apnea.  · Talk with your provider before taking any pain medicine, muscle relaxants, or sedatives. Your provider will tell you about the possible dangers of taking these medicines.  Date Last Reviewed: 12/1/2016  © 4345-8251 [email or web address] [address] [place] [address], [place] [address]. All rights reserved. This information is not intended as a substitute for professional medical care. Always follow your healthcare professional's instructions.

## 2018-05-04 NOTE — PLAN OF CARE
Patient tolerating oral liquids and crackers without difficulty. No apparent s&s of distress noted at this time, states pain better after pain meds given and denies nausea. [place] dressing C,D,I to right ear.  Discharge instructions reviewed with patient/family/friend with good verbal feedback received. Patient ready for discharge

## 2018-05-04 NOTE — ANESTHESIA POSTPROCEDURE EVALUATION
"Anesthesia Post Evaluation    Patient: Tavo Linn    Procedure(s) Performed: Procedure(s) (LRB):  Tympanoplasty / Mastoidectomy (Right)    Final Anesthesia Type: general  Patient location during evaluation: PACU  Patient participation: Yes- Able to Participate  Level of consciousness: awake and alert  Post-procedure vital signs: reviewed and stable  Pain management: adequate  Airway patency: patent  PONV status at discharge: nausea (controlled)  Anesthetic complications: no      Cardiovascular status: hemodynamically stable and blood pressure returned to baseline  Respiratory status: unassisted, spontaneous ventilation and room air  Hydration status: euvolemic  Follow-up not needed.        Visit Vitals  /82   Pulse 75   Temp 36.3 °C (97.3 °F)   Resp 14   Ht 5' 6" (1.676 m)   Wt 59 kg (130 lb)   LMP 04/25/2018   SpO2 99%   Breastfeeding? No   BMI 20.98 kg/m²       Pain/Huy Score: Pain Assessment Performed: Yes (5/4/2018 11:50 AM)  Presence of Pain: complains of pain/discomfort (5/4/2018  1:15 PM)  Pain Rating Prior to Med Admin: 6 (5/4/2018  1:14 PM)  Huy Score: 10 (5/4/2018  1:15 PM)      "

## 2018-05-04 NOTE — ANESTHESIA PREPROCEDURE EVALUATION
05/04/2018  Tavo Linn is a 22 y.o., female.    Anesthesia Evaluation      I have reviewed the Medications.     Review of Systems  Anesthesia Hx:  No problems with previous Anesthesia   Social:  Non-Smoker, No Alcohol Use    Cardiovascular:  Cardiovascular Normal     Pulmonary:  Pulmonary Normal    Renal/:  Renal/ Normal     Hepatic/GI:  Hepatic/GI Normal    Neurological:  Neurology Normal    Endocrine:  Endocrine Normal        Physical Exam  General:  Well nourished    Airway/Jaw/Neck:  Airway Findings: Mouth Opening: Normal Tongue: Normal  General Airway Assessment: Adult, Average  Mallampati: II  Jaw/Neck Findings:  Neck ROM: Normal ROM       Chest/Lungs:  Chest/Lungs Findings: Clear to auscultation, Normal Respiratory Rate     Heart/Vascular:  Heart Findings: Rate: Normal  Rhythm: Regular Rhythm  Sounds: Normal  Heart murmur: negative       Mental Status:  Mental Status Findings:  Cooperative, Alert and Oriented         Anesthesia Plan  Type of Anesthesia, risks & benefits discussed:  Anesthesia Type:  general  Patient's Preference:   Intra-op Monitoring Plan:   Intra-op Monitoring Plan Comments:   Post Op Pain Control Plan:   Post Op Pain Control Plan Comments:   Induction:   IV  Beta Blocker:  Patient is not currently on a Beta-Blocker (No further documentation required).       Informed Consent: Patient understands risks and agrees with Anesthesia plan.  Questions answered. Anesthesia consent signed with patient.  ASA Score: 1     Day of Surgery Review of History & Physical:            Ready For Surgery From Anesthesia Perspective.

## 2018-05-04 NOTE — TRANSFER OF CARE
"Anesthesia Transfer of Care Note    Patient: Tavo Linn    Procedure(s) Performed: Procedure(s) (LRB):  Tympanoplasty / Mastoidectomy (Right)    Patient location: PACU    Anesthesia Type: general    Transport from OR: Transported from OR on room air with adequate spontaneous ventilation    Post pain: adequate analgesia    Post assessment: no apparent anesthetic complications    Post vital signs: stable    Level of consciousness: responds to stimulation    Nausea/Vomiting: no nausea/vomiting    Complications: none    Transfer of care protocol was followed      Last vitals:   Visit Vitals  BP (!) 136/59 (BP Location: Right leg, Patient Position: Lying)   Pulse 75   Temp 36.4 °C (97.5 °F) (Skin)   Resp 18   Ht 5' 6" (1.676 m)   Wt 59 kg (130 lb)   LMP 04/25/2018   SpO2 (!) 94%   Breastfeeding? No   BMI 20.98 kg/m²     "

## 2018-05-04 NOTE — BRIEF OP NOTE
[facility]  Brief Operative Note     SUMMARY     Surgery Date: 5/4/2018     Surgeon(s) and Role:     * Jamie Rajan MD - Primary    Assisting Surgeon: None    Pre-op Diagnosis:  Perforation of right tympanic membrane [H72.91]  Cholesteatoma, right [H71.91]    Post-op Diagnosis:  Post-Op Diagnosis Codes:     * Perforation of right tympanic membrane [H72.91]     * Cholesteatoma, right [H71.91]    Procedure(s) (LRB):  Tympanoplasty / Mastoidectomy (Right)    Anesthesia: General    Description of the findings of the procedure: revision CWD mastoid    Findings/Key Components: revision CWD mastoid    Estimated Blood Loss: 50 mL         Specimens:   Specimen (12h ago through future)    Start     Ordered    05/04/18 1112  Specimen to Pathology - Surgery  Once     Comments:  Pre-op Diagnosis: Perforation of right tympanic membrane [H72.91]Cholesteatoma, right [H71.91]Post-op Diagnosis: sameProcedure(s):Tympanoplasty / Mastoidectomy Number of specimens: 1Name of specimens: 1- right middle ear contents      05/04/18 1111          Discharge Note    SUMMARY     Admit Date: 5/4/2018    Discharge Date and Time:  05/04/2018 11:43 AM    Hospital Course (synopsis of major diagnoses, care, treatment, and services provided during the course of the hospital stay): Did well following surgery and was discharged uneventfully     Final Diagnosis: Post-Op Diagnosis Codes:     * Perforation of right tympanic membrane [H72.91]     * Cholesteatoma, right [H71.91]    Disposition: Home or Self Care    Follow Up/Patient Instructions: Regular diet, Follow-up 1 week. Activity light    Medications:  Reconciled Home Medications:   Current Discharge Medication List      CONTINUE these medications which have NOT CHANGED    Details   fluticasone (FLONASE) 50 mcg/actuation nasal spray 1 spray (50 mcg total) by Each Nare route 2 (two) times daily.  Qty: 1 Bottle, Refills: 2           No discharge procedures on file.  Follow-up  Information     Jamie Rajan MD In 1 week.    Specialty:  Otolaryngology  Why:  For wound re-check  Contact information:  [address] [place] [id number] [phone]

## 2018-05-04 NOTE — H&P
Subjective:       Patient ID: Tavo Linn is a 22 y.o. female.    Chief Complaint: No chief complaint on file.      Tavo is here for right cholesteatoma. No changes since clinic visit     Review of Systems   Constitutional: Negative for activity change and appetite change.   Eyes: Negative for discharge.   Respiratory: Negative for difficulty breathing and wheezing   Cardiovascular: Negative for chest pain.   Gastrointestinal: Negative for abdominal distention and abdominal pain.   Endocrine: Negative for cold intolerance and heat intolerance.   Genitourinary: Negative for dysuria.   Musculoskeletal: Negative for gait problem and joint swelling.   Skin: Negative for color change and pallor.   Neurological: Negative for syncope and weakness.   Psychiatric/Behavioral: Negative for agitation and confusion.     Objective:        Constitutional:   She is oriented to person, place, and time. She appears well-developed and well-nourished. She appears alert. She is active. Normal speech.      Head:  Normocephalic and atraumatic. Head is without TMJ tenderness. No scars. Salivary glands normal.  Facial strength is normal.      Ears:    Right Ear: There is drainage. No swelling. Tympanic membrane is perforated (cholesteatoma). No middle ear effusion. Decreased hearing is noted.   Left Ear: No drainage or swelling.  No middle ear effusion.     Nose:  No mucosal edema, rhinorrhea or sinus tenderness. No turbinate hypertrophy.      Mouth/Throat  Oropharynx clear and moist without lesions or asymmetry, normal uvula midline and mirror exam normal. Normal dentition. No uvula swelling, lacerations or trismus. No oropharyngeal exudate. Tonsillar erythema, tonsillar exudate.      Neck:  Full range of motion with neck supple and no adenopathy. Thyroid tenderness is present. No tracheal deviation, no edema, no erythema, normal range of motion, no stridor, no crepitus and no neck rigidity present. No thyroid mass present.      Cardiovascular:   Intact distal pulses and normal pulses.      Pulmonary/Chest:   Effort normal and breath sounds normal. No stridor.     Psychiatric:   Her speech is normal and behavior is normal. Her mood appears not anxious. Her affect is not labile.     Neurological:   She is alert and oriented to person, place, and time. No sensory deficit.     Skin:   No abrasions, lacerations, lesions, or rashes. No abrasion and no bruising noted.         Tests / Results:  Reviewed CT scan    Assessment:       1. Cholesteatoma of ear, right          Plan:         Revision right radical mastoidectomy as planned

## 2018-05-04 NOTE — OP NOTE
05/04/2018     Tavo Linn  [id number]  1995    PRE-OPERATIVE DIAGNOSIS  1. Cholesteatoma of ear, right         POST-OPERATIVE DIAGNOSIS  1. Cholesteatoma of ear, right         PROCEDURES PERFORMED  1. Revision right modified radical tympanomastoidectomy without ossicular chain reconstruction  2. Facial nerve monitoring for 3 hours    SURGEON: Jamie Rajan MD    ASSISTANT: None    ANESTHESIA: General    COMPLICATIONS: None    BLOOD LOSS: 50    SPECIMENS  1. Right middle ear /mastoid contents    DISPOSITION  PACU    OPERATIVE FINDINGS  1. Previous canal wall down mastoid with high facial ridge and incompletely drilled mastoid.   2. Cholesteatoma in epitympanum and superior mesotympanum, keratin overlying footplate along with granulation in sinus tympani  3. 30% perforation of tympanic membrane edge of tympanic membrane perforation was adherent to promontory and this was taken down  4. Revision canal wall down mastoidectomy performed, preservation of tegmen, sigmoid sinus.  5. Tympanic segment of facial nerve with small dehiscence just above footplate, remainder nicely encased in bone. Facial ridge lowered to level of nerve, which stimulated at conclusion  6. Silastic placed in middle ear for spacer. Temporalis fascia reconstruction of TM.  7. Ossicular chain: small residual stump of malleus within granulation, removed; incus and stapes suprastructure not present.  Footplate appeared intact but there was adheions on the footplate that I did not take down. The RWR was present.    INDICATIONS  Tavo Linn is a 22 y.o. female who was seen in clinic for evaluation of the above diagnosis. She had a previous cholesteatoma as a child and underwent mastoidectomy at a young age. She has had many years of chronic right sided otorrhea and hearing loss. I obtained a scan which revealed cholesteatoma. I discussed revision of her mastoidectomy. Based on clinical assessment, the following procedures were discussed as  an option for treatment. After risks, benefits, and alternatives were discussed the patient decided to proceed. Risks included: facial nerve injury, no improvement in hearing, taste changes. Vertigo, recurrence, need for long term cleaning, cosmetic change (meatoplasty), scar, and pain.     PROCEDURE IN DETAIL  The patient was seen in the pre-operative holding area, and consent was signed. They were wheeled into the operating room and placed supine on the table. Anesthesia was induced via general endotracheal tube. Continuous facial nerve monitoring electrodes were connected to orbicularis oris, oculi, and mentalis and noted to be in proper working order during the case.     The external canal was examined and noted to be normal caliber. Transcanal injections were performed with local anesthesia. The ear was cleaned. The perforation was noted as above. The margins of the perforation were freshened.     I made a post-auricular incision 1 cm behind the sulcus through previous scar and dissected through skin and subcutaneous tissue. I elevated the ear anteriorly toward to EAC. I harvested true temporalis fascia. I had to go more anterior than usual because there was no fascia available in the usual location. I did have to ligate a branch of superficial temporal. The fascia was set on the back table to dry. I created a Pavla flap incised along temporal line posteriorly then down to mastoid bone. Periosteal flap was elevated anteriorly to the previous mastoid cavity. Self retaining retractors were placed. I created my vascular strip incision and this was elevated out with a penrose. I visualized the tympanic membrane. I elevated the scar, granulation, and cholesteatoma from posterior to anterior. I began in the mastoid cavity and elevated into the epitympanum. I identified the horizontal canal. The facial ridge was high. I removed cholesteatoma and granulation from the epitympanum. I elevated the tympanomeatal flap off  the facial ridge to view the middle ear. There were adhesions which I took down. I elevated to the eustachian tube orifice. There was no cholesteatoma here.     I saucerized the mastoid cavity and drilled to the tegmen and sigmoid. I lowered the facial ridge to the level of the nerve. This was stimulated with bone overlying. Chorda tympani had been previously taken but I took down more the bone near this to view the sinus tympani. The was granulation tissue in the sinus tympani and I took some of this out. No cholesteatoma in the sinus tympani. I irrigated the cavity. There was a small stub of malleus against the tympanic membrane but this was not salvageable. The incus was previously taken out and the stapes supra structure was not present. A small portion of the tympanic facial nerve was dehiscent, just before the 2nd genu above the posterior aspect of the oval window.    I created a large meatoplasty by making superior and inferior incisions in the cartilagenous EAC. The posterior EAC laid nicely on the mastoid area. I placed a small amount of 0.5 mm silastic sheeting in the middle ear space over the oval window and promontory for spacer. I I placed the temporalis fascia medial to the perforation and it covered the entire area nicely. I draped it over the facial ridge. I ensured that no squamous tissue was beneath the graft. Gelfoam was placed lateral to the tympanic membrane for further support. The mastoid cavity was filled with antibiotic ointment.     The vascular strip was laid back down in native position. The Pavla flap was closed with Vicryl. The skin was closed with 4-0 Vicryl in deep dermal layer. Skin was closed with mastisol and steri strips. I confirmed good placement of the vascular strip incision in the canal. I placed a Xeroform pack in the EAC to keep the meatoplasty patent.    This marked the end of the procedure. The patient was turned back over to the Anesthesia team.  They were awoken and  transported back to the PACU in stable condition. Counts were correct. I performed the entire procedure.

## 2018-05-07 ENCOUNTER — TELEPHONE (OUTPATIENT)
Dept: OTOLARYNGOLOGY | Facility: CLINIC | Age: 23
End: 2018-05-07

## 2018-05-07 NOTE — TELEPHONE ENCOUNTER
----- Message from Jamie Rajan MD sent at 5/4/2018  6:48 AM CDT -----  1 week fu for wound check. Can be Thursday if needed.

## 2018-05-10 ENCOUNTER — OFFICE VISIT (OUTPATIENT)
Dept: OTOLARYNGOLOGY | Facility: CLINIC | Age: 23
End: 2018-05-10
Payer: COMMERCIAL

## 2018-05-10 VITALS — WEIGHT: 127.63 LBS | TEMPERATURE: 99 F | BODY MASS INDEX: 20.51 KG/M2 | HEIGHT: 66 IN

## 2018-05-10 DIAGNOSIS — H71.91 CHOLESTEATOMA OF EAR, RIGHT: Primary | ICD-10-CM

## 2018-05-10 PROCEDURE — 99999 PR PBB SHADOW E&M-EST. PATIENT-LVL III: CPT | Mod: PBBFAC,,, | Performed by: OTOLARYNGOLOGY

## 2018-05-10 PROCEDURE — 99024 POSTOP FOLLOW-UP VISIT: CPT | Mod: S$GLB,,, | Performed by: OTOLARYNGOLOGY

## 2018-05-11 ENCOUNTER — OFFICE VISIT (OUTPATIENT)
Dept: OTOLARYNGOLOGY | Facility: CLINIC | Age: 23
End: 2018-05-11
Payer: COMMERCIAL

## 2018-05-11 VITALS
HEIGHT: 66 IN | BODY MASS INDEX: 20.51 KG/M2 | WEIGHT: 127.63 LBS | DIASTOLIC BLOOD PRESSURE: 80 MMHG | SYSTOLIC BLOOD PRESSURE: 125 MMHG

## 2018-05-11 DIAGNOSIS — H72.91 PERFORATION OF RIGHT TYMPANIC MEMBRANE: ICD-10-CM

## 2018-05-11 DIAGNOSIS — H71.91 CHOLESTEATOMA OF EAR, RIGHT: Primary | ICD-10-CM

## 2018-05-11 PROCEDURE — 99999 PR PBB SHADOW E&M-EST. PATIENT-LVL III: CPT | Mod: PBBFAC,,, | Performed by: OTOLARYNGOLOGY

## 2018-05-11 PROCEDURE — 99024 POSTOP FOLLOW-UP VISIT: CPT | Mod: S$GLB,,, | Performed by: OTOLARYNGOLOGY

## 2018-05-11 NOTE — PROGRESS NOTES
Postoperative Note    Postoperative visit number 2 following right revision CWD mastoidectomy.   Pain: YES: worse than yesterday   Otorrhea: NO  Hearing Improvement:no    Physical Exam:  Incision: clean, dry, well approximated, appropriate, steri strips  Pinna:   EAC/Meatus: stent in place, removed. Not replaced. Large meatus, Clot and Gelfoam in place. Ciprodex applied.  Tympanic Membrane: CNE  Middle ear: CNE    Facial Nerve Function:  normal, full    Other:  Impression:  Doing well s/p CWD mastoid  Packing out  Ciprodex  Fu 1 week

## 2018-05-14 RX ORDER — CIPROFLOXACIN AND DEXAMETHASONE 3; 1 MG/ML; MG/ML
4 SUSPENSION/ DROPS AURICULAR (OTIC) 2 TIMES DAILY
Qty: 7.5 ML | Refills: 1 | Status: SHIPPED | OUTPATIENT
Start: 2018-05-14 | End: 2018-05-24

## 2018-05-17 ENCOUNTER — OFFICE VISIT (OUTPATIENT)
Dept: OTOLARYNGOLOGY | Facility: CLINIC | Age: 23
End: 2018-05-17
Payer: COMMERCIAL

## 2018-05-17 VITALS — BODY MASS INDEX: 20.62 KG/M2 | WEIGHT: 128.31 LBS | HEIGHT: 66 IN

## 2018-05-17 DIAGNOSIS — H90.11 CONDUCTIVE HEARING LOSS OF RIGHT EAR WITH UNRESTRICTED HEARING OF LEFT EAR: ICD-10-CM

## 2018-05-17 DIAGNOSIS — H71.91 CHOLESTEATOMA OF EAR, RIGHT: Primary | ICD-10-CM

## 2018-05-17 PROCEDURE — 99999 PR PBB SHADOW E&M-EST. PATIENT-LVL II: CPT | Mod: PBBFAC,,, | Performed by: OTOLARYNGOLOGY

## 2018-05-17 PROCEDURE — 99024 POSTOP FOLLOW-UP VISIT: CPT | Mod: S$GLB,,, | Performed by: OTOLARYNGOLOGY

## 2018-05-18 PROBLEM — H90.11 CONDUCTIVE HEARING LOSS OF RIGHT EAR WITH UNRESTRICTED HEARING OF LEFT EAR: Status: ACTIVE | Noted: 2018-05-18

## 2018-05-18 NOTE — PROGRESS NOTES
Postoperative Note    Postoperative visit number 3 following right revision CWD mastoidectomy.   Pain: No  Otorrhea: No  Hearing Improvement:no    Physical Exam:  Incision: clean, dry, well approximated  Pinna:   EAC/Meatus: meatus healing well, Clot and Gelfoam in place suctioned. Epithelializing appropriately.  Tympanic Membrane: CNE  Middle ear: CNE    Facial Nerve Function:  normal, full    Other:  Impression:  Doing well s/p CWD mastoid  Continue drops  Follow-up 2 weeks

## 2018-05-30 ENCOUNTER — OFFICE VISIT (OUTPATIENT)
Dept: OTOLARYNGOLOGY | Facility: CLINIC | Age: 23
End: 2018-05-30
Payer: COMMERCIAL

## 2018-05-30 VITALS — WEIGHT: 131.38 LBS | BODY MASS INDEX: 21.89 KG/M2 | HEIGHT: 65 IN

## 2018-05-30 DIAGNOSIS — H71.91 CHOLESTEATOMA OF EAR, RIGHT: Primary | ICD-10-CM

## 2018-05-30 DIAGNOSIS — H90.11 CONDUCTIVE HEARING LOSS OF RIGHT EAR WITH UNRESTRICTED HEARING OF LEFT EAR: ICD-10-CM

## 2018-05-30 PROCEDURE — 99024 POSTOP FOLLOW-UP VISIT: CPT | Mod: S$GLB,,, | Performed by: OTOLARYNGOLOGY

## 2018-05-30 PROCEDURE — 99999 PR PBB SHADOW E&M-EST. PATIENT-LVL II: CPT | Mod: PBBFAC,,, | Performed by: OTOLARYNGOLOGY

## 2018-05-31 NOTE — PROGRESS NOTES
Postoperative Note    Postoperative visit number 4 following right revision CWD mastoidectomy.   Pain: No  Otorrhea: No  Hearing Improvement:no    Physical Exam:  Incision: clean, dry, well approximated  Pinna:   EAC/Meatus: meatus healing well, Clot and Gelfoam in place suctioned. Epithelializing appropriately.  Tympanic Membrane: intact  Middle ear: CNE    Facial Nerve Function:  normal, full    Other:  Impression:  Healing well  Dry ear precuations  Follow-up 1 month

## 2018-06-28 ENCOUNTER — OFFICE VISIT (OUTPATIENT)
Dept: OTOLARYNGOLOGY | Facility: CLINIC | Age: 23
End: 2018-06-28
Payer: COMMERCIAL

## 2018-06-28 VITALS — HEIGHT: 65 IN | BODY MASS INDEX: 21.86 KG/M2 | WEIGHT: 131.19 LBS

## 2018-06-28 DIAGNOSIS — H90.11 CONDUCTIVE HEARING LOSS OF RIGHT EAR WITH UNRESTRICTED HEARING OF LEFT EAR: Primary | ICD-10-CM

## 2018-06-28 DIAGNOSIS — H71.91 CHOLESTEATOMA OF EAR, RIGHT: ICD-10-CM

## 2018-06-28 PROCEDURE — 99024 POSTOP FOLLOW-UP VISIT: CPT | Mod: S$GLB,,, | Performed by: OTOLARYNGOLOGY

## 2018-06-28 PROCEDURE — 99999 PR PBB SHADOW E&M-EST. PATIENT-LVL II: CPT | Mod: PBBFAC,,, | Performed by: OTOLARYNGOLOGY

## 2018-06-29 NOTE — PROGRESS NOTES
Postoperative Note    Postoperative visit number 5 following right revision CWD mastoidectomy 5/4/18  Pain: No  Otorrhea: No  Hearing Improvement:no    Physical Exam:  Incision: clean, dry, well approximated  Pinna:   EAC/Meatus: meatus healed, cavity well epithelialized.  Tympanic Membrane: intact  Middle ear: dry    Facial Nerve Function:  normal, full    Other:  Impression:  Healing well  Dry ear precuations  Follow-up 3 months with audio  Can consider 2nd stage OCR at that time. Discussed decreased hearing moving forward given canal is down

## 2018-10-08 ENCOUNTER — OFFICE VISIT (OUTPATIENT)
Dept: OTOLARYNGOLOGY | Facility: CLINIC | Age: 23
End: 2018-10-08
Payer: MEDICAID

## 2018-10-08 VITALS — HEIGHT: 65 IN | WEIGHT: 131.19 LBS | BODY MASS INDEX: 21.86 KG/M2

## 2018-10-08 DIAGNOSIS — H90.11 CONDUCTIVE HEARING LOSS OF RIGHT EAR WITH UNRESTRICTED HEARING OF LEFT EAR: Primary | ICD-10-CM

## 2018-10-08 DIAGNOSIS — Z98.890 S/P TYMPANOPLASTY: Primary | ICD-10-CM

## 2018-10-08 PROBLEM — H71.91 CHOLESTEATOMA OF EAR, RIGHT: Status: RESOLVED | Noted: 2018-05-04 | Resolved: 2018-10-08

## 2018-10-08 PROCEDURE — 99214 OFFICE O/P EST MOD 30 MIN: CPT | Mod: S$PBB,,, | Performed by: OTOLARYNGOLOGY

## 2018-10-08 PROCEDURE — 99999 PR PBB SHADOW E&M-EST. PATIENT-LVL III: CPT | Mod: PBBFAC,,, | Performed by: OTOLARYNGOLOGY

## 2018-10-08 PROCEDURE — 99213 OFFICE O/P EST LOW 20 MIN: CPT | Mod: PBBFAC,PO | Performed by: OTOLARYNGOLOGY

## 2018-10-09 NOTE — PROGRESS NOTES
Subjective:       Patient ID: Tavo Linn is a 23 y.o. female.    Chief Complaint: Follow-up    Tavo is here for follow-up of revision right CWD mastoidectomy for recurrent cholesteatoma. Doing well. No otorrhea. No improvement in hearing, as expected. No vertigo.    Review of Systems   Constitutional: Negative for activity change and appetite change.   Respiratory: Negative for difficulty breathing and wheezing   Cardiovascular: Negative for chest pain.      Objective:        Constitutional:   Vital signs are normal. She appears well-developed and well-nourished.     Head:  Normocephalic and atraumatic.     Ears:  Left ear hearing normal to normal and whispered voice; external ear normal without scars, lesions, or masses; ear canal, tympanic membrane, and middle ear normal..   Right:  CWD cavity, cleaned, facial ridge normal. Retraction of TM in mesotympanum but no disease visible.         Tests / Results:  Maximum CHL AD          Assessment:       1. Conductive hearing loss of right ear with unrestricted hearing of left ear          Plan:       No disease  Discussed options: obs vs. HA vs. OCR  She would like to proceed with surgery  Discussed risk on incomplete hearing improvement given CWD cavity. She understands.     The diagnosis, details of the surgery, postoperative care, and options of treatment were discussed at length with the patient. The risks of surgery were discussed including but not limited to  infection, bleeding, recurrence, permanent deafness/hearing loss, disabling dizziness, taste disturbances, facial paralysis, need for additional surgery, and cardiopulmonary complications from anesthesia. I answered the patient's questions to satisfaction.

## 2018-10-19 ENCOUNTER — ANESTHESIA EVENT (OUTPATIENT)
Dept: SURGERY | Facility: HOSPITAL | Age: 23
End: 2018-10-19
Payer: MEDICAID

## 2018-10-22 ENCOUNTER — ANESTHESIA (OUTPATIENT)
Dept: SURGERY | Facility: HOSPITAL | Age: 23
End: 2018-10-22
Payer: MEDICAID

## 2018-10-22 ENCOUNTER — HOSPITAL ENCOUNTER (OUTPATIENT)
Facility: HOSPITAL | Age: 23
Discharge: HOME | End: 2018-10-22
Attending: OTOLARYNGOLOGY | Admitting: OTOLARYNGOLOGY
Payer: MEDICAID

## 2018-10-22 DIAGNOSIS — H71.91 CHOLESTEATOMA OF RIGHT EAR: Primary | ICD-10-CM

## 2018-10-22 DIAGNOSIS — H90.11 CONDUCTIVE HEARING LOSS IN RIGHT EAR: ICD-10-CM

## 2018-10-22 DIAGNOSIS — H90.11 CONDUCTIVE HEARING LOSS OF RIGHT EAR WITH UNRESTRICTED HEARING OF LEFT EAR: ICD-10-CM

## 2018-10-22 LAB
B-HCG UR QL: NEGATIVE
CTP QC/QA: YES

## 2018-10-22 PROCEDURE — 63600175 PHARM REV CODE 636 W HCPCS: Mod: PO | Performed by: OTOLARYNGOLOGY

## 2018-10-22 PROCEDURE — 69633 REBUILD EARDRUM STRUCTURES: CPT | Mod: RT,,, | Performed by: OTOLARYNGOLOGY

## 2018-10-22 PROCEDURE — 71000033 HC RECOVERY, INTIAL HOUR: Mod: PO | Performed by: OTOLARYNGOLOGY

## 2018-10-22 PROCEDURE — L8613 OSSICULAR IMPLANT: HCPCS | Mod: PO | Performed by: OTOLARYNGOLOGY

## 2018-10-22 PROCEDURE — 25000003 PHARM REV CODE 250: Mod: PO | Performed by: ANESTHESIOLOGY

## 2018-10-22 PROCEDURE — 21235 EAR CARTILAGE GRAFT: CPT | Mod: 59,,, | Performed by: OTOLARYNGOLOGY

## 2018-10-22 PROCEDURE — 00120 ANES PX EAR W/BX NOS: CPT | Mod: PO | Performed by: OTOLARYNGOLOGY

## 2018-10-22 PROCEDURE — 27800903 OPTIME MED/SURG SUP & DEVICES OTHER IMPLANTS: Mod: PO | Performed by: OTOLARYNGOLOGY

## 2018-10-22 PROCEDURE — 37000009 HC ANESTHESIA EA ADD 15 MINS: Mod: PO | Performed by: OTOLARYNGOLOGY

## 2018-10-22 PROCEDURE — 63600175 PHARM REV CODE 636 W HCPCS: Mod: PO | Performed by: ANESTHESIOLOGY

## 2018-10-22 PROCEDURE — 25000003 PHARM REV CODE 250: Mod: PO | Performed by: NURSE ANESTHETIST, CERTIFIED REGISTERED

## 2018-10-22 PROCEDURE — 25000003 PHARM REV CODE 250: Mod: PO | Performed by: OTOLARYNGOLOGY

## 2018-10-22 PROCEDURE — 27201423 OPTIME MED/SURG SUP & DEVICES STERILE SUPPLY: Mod: PO | Performed by: OTOLARYNGOLOGY

## 2018-10-22 PROCEDURE — 71000039 HC RECOVERY, EACH ADD'L HOUR: Mod: PO | Performed by: OTOLARYNGOLOGY

## 2018-10-22 PROCEDURE — 36000709 HC OR TIME LEV III EA ADD 15 MIN: Mod: PO | Performed by: OTOLARYNGOLOGY

## 2018-10-22 PROCEDURE — 81025 URINE PREGNANCY TEST: CPT | Mod: PO | Performed by: OTOLARYNGOLOGY

## 2018-10-22 PROCEDURE — 36000708 HC OR TIME LEV III 1ST 15 MIN: Mod: PO | Performed by: OTOLARYNGOLOGY

## 2018-10-22 PROCEDURE — D9220A PRA ANESTHESIA: Mod: CRNA,,, | Performed by: NURSE ANESTHETIST, CERTIFIED REGISTERED

## 2018-10-22 PROCEDURE — D9220A PRA ANESTHESIA: Mod: ANES,,, | Performed by: ANESTHESIOLOGY

## 2018-10-22 PROCEDURE — 37000008 HC ANESTHESIA 1ST 15 MINUTES: Mod: PO | Performed by: OTOLARYNGOLOGY

## 2018-10-22 PROCEDURE — 63600175 PHARM REV CODE 636 W HCPCS: Mod: PO | Performed by: NURSE ANESTHETIST, CERTIFIED REGISTERED

## 2018-10-22 DEVICE — IMPLANTABLE DEVICE: Type: IMPLANTABLE DEVICE | Site: EAR | Status: FUNCTIONAL

## 2018-10-22 RX ORDER — DEXAMETHASONE SODIUM PHOSPHATE 4 MG/ML
8 INJECTION, SOLUTION INTRA-ARTICULAR; INTRALESIONAL; INTRAMUSCULAR; INTRAVENOUS; SOFT TISSUE
Status: COMPLETED | OUTPATIENT
Start: 2018-10-22 | End: 2018-10-22

## 2018-10-22 RX ORDER — SODIUM CHLORIDE 0.9 % (FLUSH) 0.9 %
3 SYRINGE (ML) INJECTION
Status: DISCONTINUED | OUTPATIENT
Start: 2018-10-22 | End: 2018-10-22 | Stop reason: HOSPADM

## 2018-10-22 RX ORDER — ROCURONIUM BROMIDE 10 MG/ML
INJECTION, SOLUTION INTRAVENOUS
Status: DISCONTINUED | OUTPATIENT
Start: 2018-10-22 | End: 2018-10-22

## 2018-10-22 RX ORDER — KETAMINE HYDROCHLORIDE 100 MG/ML
INJECTION, SOLUTION INTRAMUSCULAR; INTRAVENOUS
Status: DISCONTINUED | OUTPATIENT
Start: 2018-10-22 | End: 2018-10-22

## 2018-10-22 RX ORDER — CIPROFLOXACIN AND DEXAMETHASONE 3; 1 MG/ML; MG/ML
SUSPENSION/ DROPS AURICULAR (OTIC)
Status: DISCONTINUED | OUTPATIENT
Start: 2018-10-22 | End: 2018-10-22 | Stop reason: HOSPADM

## 2018-10-22 RX ORDER — EPINEPHRINE 1 MG/ML
INJECTION INTRAMUSCULAR; INTRAVENOUS; SUBCUTANEOUS
Status: DISCONTINUED | OUTPATIENT
Start: 2018-10-22 | End: 2018-10-22 | Stop reason: HOSPADM

## 2018-10-22 RX ORDER — LIDOCAINE HCL/PF 100 MG/5ML
SYRINGE (ML) INTRAVENOUS
Status: DISCONTINUED | OUTPATIENT
Start: 2018-10-22 | End: 2018-10-22

## 2018-10-22 RX ORDER — CEFAZOLIN SODIUM 2 G/50ML
2 SOLUTION INTRAVENOUS
Status: DISCONTINUED | OUTPATIENT
Start: 2018-10-22 | End: 2018-10-22 | Stop reason: HOSPADM

## 2018-10-22 RX ORDER — OXYCODONE HYDROCHLORIDE 5 MG/1
5 TABLET ORAL
Status: DISCONTINUED | OUTPATIENT
Start: 2018-10-22 | End: 2018-10-22 | Stop reason: HOSPADM

## 2018-10-22 RX ORDER — MIDAZOLAM HYDROCHLORIDE 1 MG/ML
INJECTION INTRAMUSCULAR; INTRAVENOUS
Status: DISCONTINUED | OUTPATIENT
Start: 2018-10-22 | End: 2018-10-22

## 2018-10-22 RX ORDER — LIDOCAINE HYDROCHLORIDE 10 MG/ML
1 INJECTION, SOLUTION EPIDURAL; INFILTRATION; INTRACAUDAL; PERINEURAL ONCE
Status: COMPLETED | OUTPATIENT
Start: 2018-10-22 | End: 2018-10-22

## 2018-10-22 RX ORDER — FENTANYL CITRATE 50 UG/ML
INJECTION, SOLUTION INTRAMUSCULAR; INTRAVENOUS
Status: DISCONTINUED | OUTPATIENT
Start: 2018-10-22 | End: 2018-10-22

## 2018-10-22 RX ORDER — HYDROMORPHONE HYDROCHLORIDE 2 MG/ML
0.2 INJECTION, SOLUTION INTRAMUSCULAR; INTRAVENOUS; SUBCUTANEOUS EVERY 5 MIN PRN
Status: DISCONTINUED | OUTPATIENT
Start: 2018-10-22 | End: 2018-10-22 | Stop reason: HOSPADM

## 2018-10-22 RX ORDER — ONDANSETRON 2 MG/ML
INJECTION INTRAMUSCULAR; INTRAVENOUS
Status: DISCONTINUED | OUTPATIENT
Start: 2018-10-22 | End: 2018-10-22

## 2018-10-22 RX ORDER — BACITRACIN ZINC 500 UNIT/G
OINTMENT (GRAM) TOPICAL
Status: DISCONTINUED | OUTPATIENT
Start: 2018-10-22 | End: 2018-10-22 | Stop reason: HOSPADM

## 2018-10-22 RX ORDER — SODIUM CHLORIDE, SODIUM LACTATE, POTASSIUM CHLORIDE, CALCIUM CHLORIDE 600; 310; 30; 20 MG/100ML; MG/100ML; MG/100ML; MG/100ML
INJECTION, SOLUTION INTRAVENOUS CONTINUOUS
Status: DISCONTINUED | OUTPATIENT
Start: 2018-10-22 | End: 2018-10-22 | Stop reason: HOSPADM

## 2018-10-22 RX ORDER — MEPERIDINE HYDROCHLORIDE 50 MG/ML
12.5 INJECTION INTRAMUSCULAR; INTRAVENOUS; SUBCUTANEOUS ONCE AS NEEDED
Status: DISCONTINUED | OUTPATIENT
Start: 2018-10-22 | End: 2018-10-22 | Stop reason: HOSPADM

## 2018-10-22 RX ORDER — GLYCOPYRROLATE 0.2 MG/ML
INJECTION INTRAMUSCULAR; INTRAVENOUS
Status: DISCONTINUED | OUTPATIENT
Start: 2018-10-22 | End: 2018-10-22

## 2018-10-22 RX ORDER — ONDANSETRON 4 MG/1
4 TABLET, ORALLY DISINTEGRATING ORAL EVERY 6 HOURS PRN
Qty: 10 TABLET | Refills: 0 | Status: ON HOLD | OUTPATIENT
Start: 2018-10-22 | End: 2019-11-06 | Stop reason: CLARIF

## 2018-10-22 RX ORDER — OXYCODONE AND ACETAMINOPHEN 5; 325 MG/1; MG/1
1 TABLET ORAL EVERY 4 HOURS PRN
Qty: 5 TABLET | Refills: 0 | Status: ON HOLD | OUTPATIENT
Start: 2018-10-22 | End: 2019-11-06 | Stop reason: CLARIF

## 2018-10-22 RX ORDER — LIDOCAINE HYDROCHLORIDE AND EPINEPHRINE 10; 10 MG/ML; UG/ML
INJECTION, SOLUTION INFILTRATION; PERINEURAL
Status: DISCONTINUED | OUTPATIENT
Start: 2018-10-22 | End: 2018-10-22 | Stop reason: HOSPADM

## 2018-10-22 RX ORDER — PROPOFOL 10 MG/ML
VIAL (ML) INTRAVENOUS
Status: DISCONTINUED | OUTPATIENT
Start: 2018-10-22 | End: 2018-10-22

## 2018-10-22 RX ORDER — FENTANYL CITRATE 50 UG/ML
25 INJECTION, SOLUTION INTRAMUSCULAR; INTRAVENOUS EVERY 5 MIN PRN
Status: DISCONTINUED | OUTPATIENT
Start: 2018-10-22 | End: 2018-10-22 | Stop reason: HOSPADM

## 2018-10-22 RX ORDER — SUCCINYLCHOLINE CHLORIDE 20 MG/ML
INJECTION INTRAMUSCULAR; INTRAVENOUS
Status: DISCONTINUED | OUTPATIENT
Start: 2018-10-22 | End: 2018-10-22

## 2018-10-22 RX ADMIN — LIDOCAINE HYDROCHLORIDE 100 MG: 20 INJECTION PARENTERAL at 07:10

## 2018-10-22 RX ADMIN — GLYCOPYRROLATE 0.2 MG: 0.2 INJECTION, SOLUTION INTRAMUSCULAR; INTRAVENOUS at 08:10

## 2018-10-22 RX ADMIN — DEXAMETHASONE SODIUM PHOSPHATE 8 MG: 4 INJECTION, SOLUTION INTRAMUSCULAR; INTRAVENOUS at 06:10

## 2018-10-22 RX ADMIN — ROCURONIUM BROMIDE 5 MG: 10 INJECTION, SOLUTION INTRAVENOUS at 07:10

## 2018-10-22 RX ADMIN — CEFAZOLIN SODIUM 2 G: 2 SOLUTION INTRAVENOUS at 07:10

## 2018-10-22 RX ADMIN — FENTANYL CITRATE 50 MCG: 50 INJECTION, SOLUTION INTRAMUSCULAR; INTRAVENOUS at 07:10

## 2018-10-22 RX ADMIN — ONDANSETRON 4 MG: 2 INJECTION, SOLUTION INTRAMUSCULAR; INTRAVENOUS at 06:10

## 2018-10-22 RX ADMIN — LIDOCAINE HYDROCHLORIDE: 10 INJECTION, SOLUTION EPIDURAL; INFILTRATION; INTRACAUDAL; PERINEURAL at 06:10

## 2018-10-22 RX ADMIN — SUCCINYLCHOLINE CHLORIDE 140 MG: 20 INJECTION, SOLUTION INTRAMUSCULAR; INTRAVENOUS at 07:10

## 2018-10-22 RX ADMIN — SODIUM CHLORIDE, SODIUM LACTATE, POTASSIUM CHLORIDE, AND CALCIUM CHLORIDE: .6; .31; .03; .02 INJECTION, SOLUTION INTRAVENOUS at 06:10

## 2018-10-22 RX ADMIN — PROPOFOL 200 MG: 10 INJECTION, EMULSION INTRAVENOUS at 07:10

## 2018-10-22 RX ADMIN — MIDAZOLAM HYDROCHLORIDE 2 MG: 1 INJECTION, SOLUTION INTRAMUSCULAR; INTRAVENOUS at 06:10

## 2018-10-22 RX ADMIN — KETAMINE HYDROCHLORIDE 25 MG: 100 INJECTION, SOLUTION, CONCENTRATE INTRAMUSCULAR; INTRAVENOUS at 07:10

## 2018-10-22 NOTE — H&P
Subjective:       Patient ID: Tavo Linn is a 23 y.o. female.    Chief Complaint: No chief complaint on file.      Tavo is here with conductive hearing loss, s/p modified radial mastoidectomy. No changes since clinic visit     Review of Systems   Constitutional: Negative for activity change and appetite change.   Eyes: Negative for discharge.   Respiratory: Negative for difficulty breathing and wheezing   Cardiovascular: Negative for chest pain.   Gastrointestinal: Negative for abdominal distention and abdominal pain.   Endocrine: Negative for cold intolerance and heat intolerance.   Genitourinary: Negative for dysuria.   Musculoskeletal: Negative for gait problem and joint swelling.   Skin: Negative for color change and pallor.   Neurological: Negative for syncope and weakness.   Psychiatric/Behavioral: Negative for agitation and confusion.     Objective:        Constitutional:   She is oriented to person, place, and time. She appears well-developed and well-nourished. She appears alert. She is active. Normal speech.      Head:  Normocephalic and atraumatic. Head is without TMJ tenderness. No scars. Salivary glands normal.  Facial strength is normal.      Ears:    Right Ear: No drainage or swelling. No middle ear effusion. Decreased hearing is noted.   Left Ear: No drainage or swelling.  No middle ear effusion.     Nose:  No mucosal edema, rhinorrhea or sinus tenderness. No turbinate hypertrophy.      Mouth/Throat  Oropharynx clear and moist without lesions or asymmetry, normal uvula midline and mirror exam normal. Normal dentition. No uvula swelling, lacerations or trismus. No oropharyngeal exudate. Tonsillar erythema, tonsillar exudate.      Neck:  Full range of motion with neck supple and no adenopathy. Thyroid tenderness is present. No tracheal deviation, no edema, no erythema, normal range of motion, no stridor, no crepitus and no neck rigidity present. No thyroid mass present.     Cardiovascular:    Intact distal pulses and normal pulses.      Pulmonary/Chest:   Effort normal and breath sounds normal. No stridor.     Psychiatric:   Her speech is normal and behavior is normal. Her mood appears not anxious. Her affect is not labile.     Neurological:   She is alert and oriented to person, place, and time. No sensory deficit.     Skin:   No abrasions, lacerations, lesions, or rashes. No abrasion and no bruising noted.         Tests / Results:  Reviewed     Assessment:       1. Cholesteatoma of right ear    2. Conductive hearing loss of right ear with unrestricted hearing of left ear    3. Conductive hearing loss in right ear          Plan:         Tympanoplasty/ possible OCR as planned

## 2018-10-22 NOTE — ANESTHESIA PREPROCEDURE EVALUATION
10/22/2018  Tavo Linn is a 23 y.o., female.    Anesthesia Evaluation    I have reviewed the Patient Summary Reports.    I have reviewed the Nursing Notes.   I have reviewed the Medications.     Review of Systems  Anesthesia Hx:  No problems with previous Anesthesia    Social:  Non-Smoker    EENT/Dental:   Conductive Hearing Loss Otitis Media   Cardiovascular:  Cardiovascular Normal     Pulmonary:  Pulmonary Normal    Renal/:  Renal/ Normal     Neurological:  Neurology Normal    Endocrine:  Endocrine Normal        Physical Exam  General:  Well nourished    Airway/Jaw/Neck:  Airway Findings: Mouth Opening: Normal Tongue: Normal  General Airway Assessment: Adult  Oropharynx Findings:  Mallampati: II  Jaw/Neck Findings:  Neck ROM: Normal ROM     Eyes/Ears/Nose:  Eyes/Ears/Nose Findings:    Dental:  Dental Findings:   Chest/Lungs:  Chest/Lungs Findings: Normal Respiratory Rate     Heart/Vascular:  Heart Findings: Rate: Normal  Rhythm: Regular Rhythm        Mental Status:  Mental Status Findings:  Cooperative, Alert and Oriented         Anesthesia Plan  Type of Anesthesia, risks & benefits discussed:  Anesthesia Type:  general  Patient's Preference:   Intra-op Monitoring Plan: standard ASA monitors  Intra-op Monitoring Plan Comments:   Post Op Pain Control Plan: multimodal analgesia  Post Op Pain Control Plan Comments:   Induction:   IV  Beta Blocker:  Patient is not currently on a Beta-Blocker (No further documentation required).       Informed Consent: Patient understands risks and agrees with Anesthesia plan.  Questions answered. Anesthesia consent signed with patient.  ASA Score: 2     Day of Surgery Review of History & Physical:  There are no significant changes.   H&P completed by Anesthesiologist.       Ready For Surgery From Anesthesia Perspective.

## 2018-10-22 NOTE — OP NOTE
10/22/2018     Tavo Linn  [id number]  1995    PRE-OPERATIVE DIAGNOSIS  1. Cholesteatoma of right ear    2. Conductive hearing loss of right ear with unrestricted hearing of left ear    3. Conductive hearing loss in right ear         POST-OPERATIVE DIAGNOSIS  1. Cholesteatoma of right ear    2. Conductive hearing loss of right ear with unrestricted hearing of left ear    3. Conductive hearing loss in right ear         PROCEDURES PERFORMED  1. Right transcanal tympanoplasty with ossicular chain reconstruction  2. [place] of cartilage and fascia graft from separate incision  3. Facial nerve monitoring for 1 hour    SURGEON: Jamie Rajan MD    ASSISTANT: None    ANESTHESIA: General    COMPLICATIONS: None    BLOOD LOSS: 10 cc    SPECIMENS  1. None    DISPOSITION  PACU    OPERATIVE FINDINGS  1. Absent ossicles, footplate intact and mobile. Pneumatized middle ear space  2. Previously drilled canal wall down mastoidectomy - no residual cholesteatoma  3. Dehiscent horizontal segment of facial nerve above footplate. Was not disturbed and stimulated strongly at conclusion  4. Cartilage harvest from tragus.   5. Ossicular chain reconstruction using a [place] Rylie TORP sized at 3.5 mm with a 0.4 mm cartilage interposition    INDICATIONS  Tavo Linn is a 23 y.o. female who was seen in clinic for evaluation of the above diagnosis. She previously underwent a revision canal wall down mastoidectomy by me for extensive cholesteatoma. She had persistent expected conductive hearing loss because I elected not to perform an OCR at the initial stage. She was doing well and presented for options of reconstruction. I discussed observation, hearing aid, and attempt at OCR. Based on clinical assessment, the following procedures were discussed as an option for treatment. After risks, benefits, and alternatives were discussed the patient decided to proceed.     PROCEDURE IN DETAIL  The patient was seen in the  pre-operative holding area, and consent was signed. They were wheeled into the operating room and placed supine on the table. Anesthesia was induced and an endotracheal tube was placed. The bed was turned 180 degrees. The patient was secured to the bed and padded appropriately. The right ear was confirmed by pre-operative marking and audiogram. The post-auricular and tragal regions were injected with 1% Lidocaine with 1:100,000 epinephrine. The ear was prepped with betadine. she was draped in a sterile fashion     Facial nerve monitoring was used during the procedure because the last procedure she was noted to have dehiscent facial nerve along the horizontal segment. Electrodes were placed on the orbicularis oculi and oris mm. And noted to be working properly during the entire procedure.     A large speculum was used. The previous meatus was appropriately large. Transcanal injections were performed with local anesthesia. The ear was cleaned. She had a low facial ridge was bone over the vertical segment. I created a tympanomeatal flap along the facial ridge and elevated toward the facial recess and mesotympanic space. I entered the middle ear space which was pneumatized but slightly retracted. I identified the footplate and round window. I elevated the tympanomeatal flap anteriorly along the horizontal segment of the nerve. The nerve was dehiscent above the footplate, which I expected from previous surgery. The nerve was undisturbed and stimulated well throughout the case. The footplate was mobile with a + RWR.     I harvested tragal cartilage. I made a separate incision over the tragus and dissected on the posterior aspect. I made a small window through the cartilage and dissected on the anterior aspect. I harvested a portion of catilage with perichondrium on both sides. I removed the perichondrium off of one aspect and pressed and dried it for later in the case.  I closed the incision with 5-0 gut suture. I used  the [employer] cartilage cutter to trim the cartilage to 0.4 mm thickness and the perichondrium was intact. The [employer] [place] Rylie TORP was opened and trimmed to 3.5 mm after measuring distance from footplate to TM. I used an cyanoacrylate to adhere the cartilage interposition to the head of the TORP and this was completely dried prior to insertion into the middle ear space. The TORP was placed into the middle ear space and propped up to contact the TM. RWR was present. A small amount of Gelfoam was placed to buttress the graft in place. The perichondrium fascia graft was then placed over the TORP and the tympanomeatal flap was laid back down and the edges were everted. Gelfoam was placed lateral to the tympanic membrane for further support. Antibiotic ointment was applied to the lateral external ear and incision. The patient's ear was cleaned. The patient was seen in PACU where facial nerve was intact and was not having vertigo.     This marked the end of the procedure. The patient was turned back over to the Anesthesia team.  They were awoken and transported back to the PACU in stable condition. Counts were correct. I performed the entire procedure.

## 2018-10-22 NOTE — DISCHARGE INSTRUCTIONS
Postoperative Instructions  Chronic Ear Surgery Tympanoplasty with Ossicular Chain Reconstruction  Jamie Rajan MD  Otolarynology  [facility]  [phone]  Cell phone - (after hours / weekends) - [phone]    Leaving the Hospital   You will receive a prescription for pain medicine and sometimes an anti-nausea medicine.     Home Care-The First Few Days   First 24 hours after surgery:  o Remove all gauze from around the ear  o Remove the cotton ball from the outermost part of the ear canal.  o Replace cotton ball several times daily as needed to absorb the drainage.   It is normal to see blood-tinged or brown drainage from the ear for several days.   If the drainage becomes yellow, green, or has a foul smelling odor, please call the office.   The ear canal will be filled with dissolvable packing, which should be left in place.   With some procedures a half-inch gauze roll is placed in the outer ear canal and should also be left in place.   Do not clean the ear canal with cotton swabs. Clean the outer ear with a soft cloth or cotton swabs to remove dried blood, but if the ear is tender this is not necessary.   You may wash your hair 2 days after surgery but keep all water out of the ear canal. Use a cotton ball coated heavily with antibiotic ointment and place it in the outermost part of the ear canal.   Do not blow your nose for 4 weeks after surgery. Sniffing is okay.   You may fly 6 weeks following surgery. Whenever you fly, take an over the counter decongestant 30 minutes before take-off. Before the plane begins to descend, spray your nose with Neosynephrine or Afrin nasal spray. Use this procedure whenever you fly in the future. You may use the same treatment when traveling by car in mountainous region.   No vigorous physical activity, including sports, until seen for your post-operative visit. With exception of these restrictions, you may return to work or school as overall  condition permits. After 3 weeks you may resume all activities, including sports and physical exercise.   You may hear a variety of noises in your ear such as cracking or popping. This is part of the normal healing process.   Dizziness or lightheadedness is normal for up to 1 week after surgery.  Home Care- After the First Few Days   Drainage should begin to decrease and pain should also subside. Please continue taking Ibuprofen or Tylenol for relief of mild pain. It is normal for the top ½ of the ear to feel numb and this will take several months to return to normal.   There may be change in taste (usually described as metallic) on one side of the tongue and this usually improves within several months.  The incision on the ear is closed with absorbable sutures. It can be covered with ointment   Continue to keep all water out of the ear canal.  First Follow-up Appointment   Call the office for a follow-up appointment at the time recommended (typically 3 weeks following surgery) by Dr. Rajan. Sometimes this appointment may be with your referring otolaryngologist, in which case,  will send a letter detailing your surgery and recommended follow-up care before you see the physician.  Call office if:   Increased pain not relieved by prescription medications.   Large amounts of bleeding from the ear area.   Pus/Foul smelling drainage from the ear.   Redness in the ear area.   Temperature over 100° on 2 consecutive readings.   Severe dizziness.        Discharge Instructions: After Your Surgery  Youve just had surgery. During surgery, you were given medicine called anesthesia to keep you relaxed and free of pain. After surgery, you may have some pain or nausea. This is common. Here are some tips for feeling better and getting well after surgery.     Stay on schedule with your medicine.   Going home  Your healthcare provider will show you how to take care of yourself when you go home. He or she  will also answer your questions. Have an adult family member or friend drive you home. For the first 24 hours after your surgery:  · Do not drive or use heavy equipment.  · Do not make important decisions or sign legal papers.  · Do not drink alcohol.  · Have someone stay with you, if needed. He or she can watch for problems and help keep you safe.  Be sure to go to all follow-up visits with your healthcare provider. And rest after your surgery for as long as your healthcare provider tells you to.  Coping with pain  If you have pain after surgery, pain medicine will help you feel better. Take it as told, before pain becomes severe. Also, ask your healthcare provider or pharmacist about other ways to control pain. This might be with heat, ice, or relaxation. And follow any other instructions your surgeon or nurse gives you.  Tips for taking pain medicine  To get the best relief possible, remember these points:  · Pain medicines can upset your stomach. Taking them with a little food may help.  · Most pain relievers taken by mouth need at least 20 to 30 minutes to start to work.  · Taking medicine on a schedule can help you remember to take it. Try to time your medicine so that you can take it before starting an activity. This might be before you get dressed, go for a walk, or sit down for dinner.  · Constipation is a common side effect of pain medicines. Call your healthcare provider before taking any medicines such as laxatives or stool softeners to help ease constipation. Also ask if you should skip any foods. Drinking lots of fluids and eating foods such as fruits and vegetables that are high in fiber can also help. Remember, do not take laxatives unless your surgeon has prescribed them.  · Drinking alcohol and taking pain medicine can cause dizziness and slow your breathing. It can even be deadly. Do not drink alcohol while taking pain medicine.  · Pain medicine can make you react more slowly to things. Do not  drive or run machinery while taking pain medicine.  Your healthcare provider may tell you to take acetaminophen to help ease your pain. Ask him or her how much you are supposed to take each day. Acetaminophen or other pain relievers may interact with your prescription medicines or other over-the-counter (OTC) medicines. Some prescription medicines have acetaminophen and other ingredients. Using both prescription and OTC acetaminophen for pain can cause you to overdose. Read the labels on your OTC medicines with care. This will help you to clearly know the list of ingredients, how much to take, and any warnings. It may also help you not take too much acetaminophen. If you have questions or do not understand the information, ask your pharmacist or healthcare provider to explain it to you before you take the OTC medicine.  Managing nausea  Some people have an upset stomach after surgery. This is often because of anesthesia, pain, or pain medicine, or the stress of surgery. These tips will help you handle nausea and eat healthy foods as you get better. If you were on a special food plan before surgery, ask your healthcare provider if you should follow it while you get better. These tips may help:  · Do not push yourself to eat. Your body will tell you when to eat and how much.  · Start off with clear liquids and soup. They are easier to digest.  · Next try semi-solid foods, such as mashed potatoes, applesauce, and gelatin, as you feel ready.  · Slowly move to solid foods. Dont eat fatty, rich, or spicy foods at first.  · Do not force yourself to have 3 large meals a day. Instead eat smaller amounts more often.  · Take pain medicines with a small amount of solid food, such as crackers or toast, to avoid nausea.     Call your surgeon if  · You still have pain an hour after taking medicine. The medicine may not be strong enough.  · You feel too sleepy, dizzy, or groggy. The medicine may be too strong.  · You have side  effects like nausea, vomiting, or skin changes, such as rash, itching, or hives.       If you have obstructive sleep apnea  You were given anesthesia medicine during surgery to keep you comfortable and free of pain. After surgery, you may have more apnea spells because of this medicine and other medicines you were given. The spells may last longer than usual.   At home:  · Keep using the continuous positive airway pressure (CPAP) device when you sleep. Unless your healthcare provider tells you not to, use it when you sleep, day or night. CPAP is a common device used to treat obstructive sleep apnea.  · Talk with your provider before taking any pain medicine, muscle relaxants, or sedatives. Your provider will tell you about the possible dangers of taking these medicines.  Date Last Reviewed: 12/1/2016  © 4658-1059 [employer] [address] [place] [address], [place] [address]. All rights reserved. This information is not intended as a substitute for professional medical care. Always follow your healthcare professional's instructions.

## 2018-10-22 NOTE — BRIEF OP NOTE
[facility]  Brief Operative Note     SUMMARY     Surgery Date: 10/22/2018     Surgeon(s) and Role:     * Jamie Rajan MD - Primary    Assisting Surgeon: None    Pre-op Diagnosis:  Conductive hearing loss of right ear with unrestricted hearing of left ear [H90.11]    Post-op Diagnosis:  Post-Op Diagnosis Codes:     * Conductive hearing loss of right ear with unrestricted hearing of left ear [H90.11]    Procedure(s) (LRB):  TYMPANOPLASTY (Right)  RECONSTRUCTION, OSSICULAR CHAIN (Right)    Anesthesia: General    Description of the findings of the procedure: Tymp / OCR    Findings/Key Components: Aerated middle ear space, dehiscent horizontal segment of facial nerve overlying footplate which was intact and stimulated at conclusion. GM Offset Rylie [place] TORP 3.5 mm with cartilage interposition placed with + RWR at conclusion    Estimated Blood Loss: * No values recorded between 10/22/2018  7:34 AM and 10/22/2018  8:49 AM *         Specimens:   Specimen (12h ago, onward)    None          Discharge Note    SUMMARY     Admit Date: 10/22/2018    Discharge Date and Time:  10/22/2018 8:49 AM    Hospital Course (synopsis of major diagnoses, care, treatment, and services provided during the course of the hospital stay): Did well following surgery and was discharged uneventfully     Final Diagnosis: Post-Op Diagnosis Codes:     * Conductive hearing loss of right ear with unrestricted hearing of left ear [H90.11]    Disposition: Home or Self Care    Follow Up/Patient Instructions: Regular diet, Follow-up 3 weeks. Activity light    Medications:  Reconciled Home Medications:   Current Discharge Medication List      START taking these medications    Details   ondansetron (ZOFRAN-ODT) 4 MG TbDL Take 1 tablet (4 mg total) by mouth every 6 (six) hours as needed.  Qty: 10 tablet, Refills: 0      oxyCODONE-acetaminophen (PERCOCET) 5-325 mg per tablet Take 1 tablet by mouth every 4 (four) hours as needed.  Qty: 5  tablet, Refills: 0         CONTINUE these medications which have NOT CHANGED    Details   fluticasone (FLONASE) 50 mcg/actuation nasal spray 1 spray (50 mcg total) by Each Nare route 2 (two) times daily.  Qty: 1 Bottle, Refills: 2           No discharge procedures on file.

## 2018-10-22 NOTE — PLAN OF CARE
Pt meets criteria for d/c, tolerating PO, discharge instructions reviewed with pt and spouse, both verbalizes understanding. Cotton intact to right ear.

## 2018-10-22 NOTE — ANESTHESIA POSTPROCEDURE EVALUATION
"Anesthesia Post Evaluation    Patient: Tavo Linn    Procedure(s) Performed: Procedure(s) (LRB):  TYMPANOPLASTY (Right)  RECONSTRUCTION, OSSICULAR CHAIN (Right)    Final Anesthesia Type: general  Patient location during evaluation: PACU  Patient participation: Yes- Able to Participate  Level of consciousness: awake and alert and oriented  Post-procedure vital signs: reviewed and stable  Pain management: adequate  Airway patency: patent  PONV status at discharge: No PONV  Anesthetic complications: no      Cardiovascular status: blood pressure returned to baseline and stable  Respiratory status: unassisted and spontaneous ventilation  Hydration status: euvolemic  Follow-up not needed.        Visit Vitals  /80 (BP Location: Left arm, Patient Position: Lying)   Pulse 78   Temp 36.2 °C (97.2 °F) (Tympanic)   Resp 16   Ht 5' 5" (1.651 m)   Wt 59.5 kg (131 lb 2.8 oz)   LMP 10/19/2018 (Approximate)   SpO2 99%   Breastfeeding? No   BMI 21.83 kg/m²       Pain/Huy Score: Pain Assessment Performed: Yes (10/22/2018 10:07 AM)  Presence of Pain: denies (10/22/2018 10:07 AM)  Huy Score: 10 (10/22/2018 10:07 AM)        "

## 2018-10-22 NOTE — TRANSFER OF CARE
"Anesthesia Transfer of Care Note    Patient: Tavo Linn    Procedure(s) Performed: Procedure(s) (LRB):  TYMPANOPLASTY (Right)  RECONSTRUCTION, OSSICULAR CHAIN (Right)    Patient location: PACU    Anesthesia Type: general    Transport from OR: Transported from OR on room air with adequate spontaneous ventilation    Post pain: adequate analgesia    Post assessment: no apparent anesthetic complications and tolerated procedure well    Post vital signs: stable    Level of consciousness: awake and sedated    Nausea/Vomiting: no nausea/vomiting    Complications: none    Transfer of care protocol was followed      Last vitals:   Visit Vitals  /60 (BP Location: Left arm, Patient Position: Lying)   Pulse 75   Temp 36.7 °C (98.1 °F) (Skin)   Resp 11   Ht 5' 5" (1.651 m)   Wt 59.5 kg (131 lb 2.8 oz)   LMP 10/19/2018 (Approximate)   SpO2 97%   Breastfeeding? No   BMI 21.83 kg/m²     "

## 2018-10-23 VITALS
HEART RATE: 78 BPM | BODY MASS INDEX: 21.86 KG/M2 | HEIGHT: 65 IN | SYSTOLIC BLOOD PRESSURE: 120 MMHG | RESPIRATION RATE: 16 BRPM | DIASTOLIC BLOOD PRESSURE: 80 MMHG | TEMPERATURE: 97 F | OXYGEN SATURATION: 99 % | WEIGHT: 131.19 LBS

## 2018-10-24 ENCOUNTER — TELEPHONE (OUTPATIENT)
Dept: OTOLARYNGOLOGY | Facility: CLINIC | Age: 23
End: 2018-10-24

## 2018-11-13 ENCOUNTER — OFFICE VISIT (OUTPATIENT)
Dept: OTOLARYNGOLOGY | Facility: CLINIC | Age: 23
End: 2018-11-13
Payer: MEDICAID

## 2018-11-13 VITALS — WEIGHT: 134.94 LBS | BODY MASS INDEX: 22.48 KG/M2 | HEIGHT: 65 IN

## 2018-11-13 DIAGNOSIS — H90.11 CONDUCTIVE HEARING LOSS OF RIGHT EAR WITH UNRESTRICTED HEARING OF LEFT EAR: ICD-10-CM

## 2018-11-13 DIAGNOSIS — Z98.890 S/P TYMPANOPLASTY: Primary | ICD-10-CM

## 2018-11-13 PROCEDURE — 99999 PR PBB SHADOW E&M-EST. PATIENT-LVL II: CPT | Mod: PBBFAC,,, | Performed by: OTOLARYNGOLOGY

## 2018-11-13 PROCEDURE — 99212 OFFICE O/P EST SF 10 MIN: CPT | Mod: PBBFAC,PO | Performed by: OTOLARYNGOLOGY

## 2018-11-13 PROCEDURE — 99024 POSTOP FOLLOW-UP VISIT: CPT | Mod: ,,, | Performed by: OTOLARYNGOLOGY

## 2018-11-13 RX ORDER — OFLOXACIN 3 MG/ML
5 SOLUTION AURICULAR (OTIC) 2 TIMES DAILY
Qty: 5 ML | Refills: 1 | Status: SHIPPED | OUTPATIENT
Start: 2018-11-13 | End: 2018-11-20

## 2018-11-14 NOTE — PROGRESS NOTES
Postoperative Note    Postoperative visit number 1 following right 2nd stage OCR 10/22/18 following revision CWD mastoidectomy  Pain: NO  Otorrhea: NO  Hearing Improvement: no  Other Symptoms: no    Physical Exam:  Incision: clean, dry, well approximated, appropriate erythema  Pinna:   EAC/Meatus: packing removed, healed TM flap. TM draped over cartilage interposition with no extrusion.   Tympanic Membrane: intact  Middle ear: packing in place      Other:  Impression:  Doing well s/p 2nd stage OCR  Plan:  Floxin x 7 days  FU 6 weeks  Audiogram next visit

## 2018-12-26 ENCOUNTER — TELEPHONE (OUTPATIENT)
Dept: OTOLARYNGOLOGY | Facility: CLINIC | Age: 23
End: 2018-12-26

## 2018-12-26 NOTE — TELEPHONE ENCOUNTER
----- Message from Laura Kyle sent at 12/26/2018  3:13 PM CST -----  Contact: self  Patient need to speak to nurse regarding scheduling appt due to feeling nausea for 2 weeks off and on  possibly due to surgery she had on ear per patient     Epic earliest is 01/14 other than pt original appt on 01/08     Please call to advice [phone]

## 2018-12-26 NOTE — TELEPHONE ENCOUNTER
I spoke with patient she stated she is nauseous every other day.  I explained that is most likely not from your ear surgery. I explained I would ask Dr Rajan.  But id the nausea is causing concern she needs to call her PCP.  She stated she would wait til her appointment on the 8 th to discuss with Dr Rajan.

## 2019-01-08 ENCOUNTER — OFFICE VISIT (OUTPATIENT)
Dept: OTOLARYNGOLOGY | Facility: CLINIC | Age: 24
End: 2019-01-08
Payer: MEDICAID

## 2019-01-08 ENCOUNTER — CLINICAL SUPPORT (OUTPATIENT)
Dept: AUDIOLOGY | Facility: CLINIC | Age: 24
End: 2019-01-08
Payer: MEDICAID

## 2019-01-08 VITALS — WEIGHT: 134.94 LBS | HEIGHT: 65 IN | BODY MASS INDEX: 22.48 KG/M2

## 2019-01-08 DIAGNOSIS — H71.91 CHOLESTEATOMA OF EAR, RIGHT: ICD-10-CM

## 2019-01-08 DIAGNOSIS — H90.11 CONDUCTIVE HEARING LOSS OF RIGHT EAR WITH UNRESTRICTED HEARING OF LEFT EAR: Primary | ICD-10-CM

## 2019-01-08 DIAGNOSIS — Z98.890 S/P TYMPANOPLASTY: Primary | ICD-10-CM

## 2019-01-08 PROCEDURE — 99024 PR POST-OP FOLLOW-UP VISIT: ICD-10-PCS | Mod: ,,, | Performed by: OTOLARYNGOLOGY

## 2019-01-08 PROCEDURE — 92567 TYMPANOMETRY: CPT | Mod: PBBFAC,PO | Performed by: AUDIOLOGIST

## 2019-01-08 PROCEDURE — 92553 AUDIOMETRY AIR & BONE: CPT | Mod: PBBFAC,PO | Performed by: AUDIOLOGIST

## 2019-01-08 PROCEDURE — 99999 PR PBB SHADOW E&M-EST. PATIENT-LVL I: ICD-10-PCS | Mod: PBBFAC,,,

## 2019-01-08 PROCEDURE — 99024 POSTOP FOLLOW-UP VISIT: CPT | Mod: ,,, | Performed by: OTOLARYNGOLOGY

## 2019-01-08 PROCEDURE — 99999 PR PBB SHADOW E&M-EST. PATIENT-LVL II: CPT | Mod: PBBFAC,,, | Performed by: OTOLARYNGOLOGY

## 2019-01-08 PROCEDURE — 99999 PR PBB SHADOW E&M-EST. PATIENT-LVL II: ICD-10-PCS | Mod: PBBFAC,,, | Performed by: OTOLARYNGOLOGY

## 2019-01-08 PROCEDURE — 99999 PR PBB SHADOW E&M-EST. PATIENT-LVL I: CPT | Mod: PBBFAC,,,

## 2019-01-08 PROCEDURE — 99211 OFF/OP EST MAY X REQ PHY/QHP: CPT | Mod: PBBFAC,PO,25

## 2019-01-08 PROCEDURE — 99212 OFFICE O/P EST SF 10 MIN: CPT | Mod: PBBFAC,27,PO,25 | Performed by: OTOLARYNGOLOGY

## 2019-01-09 NOTE — PROGRESS NOTES
Subjective:       Patient ID: Tavo Linn is a 23 y.o. female.    Chief Complaint: Follow-up    Tavo is here for follow-up of R recurrent cholesteatoma.   S/p revision CWD mastoidectomy, subsequent 2nd stage OCR  Feels maybe slight improvement in hearing in certain situations. No vertigo. No other issues. Denies otorrhea. Denies pain.     Review of Systems   Constitutional: Negative for activity change and appetite change.   Respiratory: Negative for difficulty breathing and wheezing   Cardiovascular: Negative for chest pain.      Objective:        Constitutional:   Vital signs are normal. She appears well-developed and well-nourished.     Head:  Normocephalic and atraumatic.     Ears:  Left ear hearing normal to normal and whispered voice; external ear normal without scars, lesions, or masses; ear canal, tympanic membrane, and middle ear normal..   Right Ear: No drainage. Decreased hearing is noted.   Ears:      Neck:  Neck normal without thyromegaly masses, asymmetry, normal tracheal structure, crepitus, and tenderness.         Tests / Results:          Assessment:       1. Conductive hearing loss of right ear with unrestricted hearing of left ear    2. Cholesteatoma of ear, right          Plan:         HUNG  HA candidate on right  FU 6 mos, sooner prn

## 2019-10-07 PROBLEM — R10.9 ABDOMINAL CRAMPING AFFECTING PREGNANCY: Status: ACTIVE | Noted: 2019-10-07

## 2019-10-07 PROBLEM — O26.899 ABDOMINAL CRAMPING AFFECTING PREGNANCY: Status: ACTIVE | Noted: 2019-10-07

## 2019-10-28 PROBLEM — O47.9 UTERINE CONTRACTIONS DURING PREGNANCY: Status: ACTIVE | Noted: 2019-10-28

## 2019-11-06 PROBLEM — Z34.90 ENCOUNTER FOR INDUCTION OF LABOR: Status: ACTIVE | Noted: 2019-11-06

## 2021-03-28 PROBLEM — K64.4 ANAL SKIN TAG: Status: ACTIVE | Noted: 2021-03-28

## 2024-11-04 ENCOUNTER — OFFICE VISIT (OUTPATIENT)
Dept: OTOLARYNGOLOGY | Facility: CLINIC | Age: 29
End: 2024-11-04
Payer: MEDICAID

## 2024-11-04 VITALS — WEIGHT: 148.56 LBS | BODY MASS INDEX: 23.98 KG/M2

## 2024-11-04 DIAGNOSIS — Z90.89 HISTORY OF RIGHT MASTOIDECTOMY: ICD-10-CM

## 2024-11-04 DIAGNOSIS — H70.91 INFECTION OF RIGHT MASTOID BOWL: Primary | ICD-10-CM

## 2024-11-04 PROCEDURE — 3008F BODY MASS INDEX DOCD: CPT | Mod: CPTII,,, | Performed by: OTOLARYNGOLOGY

## 2024-11-04 PROCEDURE — 99212 OFFICE O/P EST SF 10 MIN: CPT | Mod: PBBFAC,PO | Performed by: OTOLARYNGOLOGY

## 2024-11-04 PROCEDURE — 99203 OFFICE O/P NEW LOW 30 MIN: CPT | Mod: 25,S$PBB,, | Performed by: OTOLARYNGOLOGY

## 2024-11-04 PROCEDURE — 69220 CLEAN OUT MASTOID CAVITY: CPT | Mod: PBBFAC,PO | Performed by: OTOLARYNGOLOGY

## 2024-11-04 PROCEDURE — 1159F MED LIST DOCD IN RCRD: CPT | Mod: CPTII,,, | Performed by: OTOLARYNGOLOGY

## 2024-11-04 PROCEDURE — 69220 CLEAN OUT MASTOID CAVITY: CPT | Mod: S$PBB,RT,, | Performed by: OTOLARYNGOLOGY

## 2024-11-04 PROCEDURE — 99999 PR PBB SHADOW E&M-EST. PATIENT-LVL II: CPT | Mod: PBBFAC,,, | Performed by: OTOLARYNGOLOGY

## 2024-11-04 PROCEDURE — 1160F RVW MEDS BY RX/DR IN RCRD: CPT | Mod: CPTII,,, | Performed by: OTOLARYNGOLOGY

## 2024-11-04 RX ORDER — CIPROFLOXACIN HYDROCHLORIDE 3 MG/ML
3 SOLUTION/ DROPS OPHTHALMIC 2 TIMES DAILY
Qty: 10 ML | Refills: 2 | Status: SHIPPED | OUTPATIENT
Start: 2024-11-04 | End: 2024-11-14

## 2024-11-04 NOTE — PROGRESS NOTES
Subjective:       Patient ID: Tavo Linn is a 29 y.o. female.    Chief Complaint: Ear Problem (Pt states her right ear was bleeding for a week and has now stopped )      Tavo is here for bleeding from right ear.   Length of symptoms:  1 week ago.  Patient has a history of right canal wall down mastoidectomy remotely and I revised it about 5 years ago.  She has a maximal conductive loss on this side as expected.  She denies any pain.  Bleeding was self-limiting and has not happened since.    Patient validated questionnaires (if applicable):      %            No data to display                   No data to display                   No data to display                     Social History     Tobacco Use   Smoking Status Never   Smokeless Tobacco Never     Social History     Substance and Sexual Activity   Alcohol Use No          Objective:        Constitutional:   She is oriented to person, place, and time. She appears well-developed and well-nourished. She appears alert. She is active.     Ears:    Purulence and keratin filling right mastoid bowl and canal.  Debridement required.  Left cerumen impaction filling canal, cleaned under microscopy    Mouth/Throat  Lips, teeth, and gums normal.     Pulmonary/Chest:   Effort normal. No accessory muscle usage. No respiratory distress.     Psychiatric:   She has a normal mood and affect.     Neurological:   She is alert and oriented to person, place, and time.         Tests / Results:  Procedure: simple debridement of right mastoid bowl  Reason: history of canal wall down mastoidectomy with need for routine cleanings, cerumen buildup, chronic otorrhea  Details: microscope used to obtain view of the ear canal. Cerumen and keratin removed with the use curette, suction, and alligator forceps  Findings:  Purulence and keratin lining the entire oral canal and mastoid bowl.  Clean to reveal a relatively open cavity.  Patient tolerated procedure well.      Assessment:       1.  Infection of right mastoid bowl    2. History of right mastoidectomy          Plan:         Mastoid bowl cleaning as above  Needs regular cleanings.  Can plan for yearly and titrate as needed   Ciloxan HCl for infection times 10 days   RTC 1 year

## (undated) DEVICE — SEE MEDLINE ITEM 152622

## (undated) DEVICE — NEPTUNE 4 PORT MANIFOLD

## (undated) DEVICE — DRAIN PENROSE XRAY 12 X 1/4 ST

## (undated) DEVICE — GLOVE SURGICAL LATEX SZ 7

## (undated) DEVICE — SUT 2/0 30IN SILK BLK BRAI

## (undated) DEVICE — ELECTRODE REM PLYHSV RETURN 9

## (undated) DEVICE — Device

## (undated) DEVICE — BLADE SURG #15 CARBON STEEL

## (undated) DEVICE — DRESSING TELFA STRL 4X3 LF

## (undated) DEVICE — ELECTRODE BLADE W/SLEEVE 2.75

## (undated) DEVICE — DRAPE OPTHALMIC W/POUCH

## (undated) DEVICE — PENCIL ROCKER SWITCH 10FT CORD

## (undated) DEVICE — CLOSURE SKIN STERI STRIP 1/4X3

## (undated) DEVICE — SOL LR INJ 1000ML BG

## (undated) DEVICE — SCRUB 10% POVIDONE IODINE 4OZ

## (undated) DEVICE — SEE MEDLINE ITEM 154981

## (undated) DEVICE — PROBE SIMULATOR KRAFF

## (undated) DEVICE — CATH IV OPTIVA OCRILON

## (undated) DEVICE — COTTON BALLS 1/2IN

## (undated) DEVICE — DRESSING XEROFORM 1X8IN

## (undated) DEVICE — FORCEP STRAIGHT DISP

## (undated) DEVICE — BLADE SCALP BEAVER 2.5MM ANG

## (undated) DEVICE — WIPE MICRO TIP

## (undated) DEVICE — CONTAINER SPECIMEN STRL 4OZ

## (undated) DEVICE — SEE MEDLINE ITEM 146313

## (undated) DEVICE — DRAPE STERI 32 X 50

## (undated) DEVICE — DRAPE STERI INSTRUMENT 1018

## (undated) DEVICE — SUT 5/0 18IN PLAIN GUT D/A

## (undated) DEVICE — SYRINGE SAFETY LOK 3CC

## (undated) DEVICE — SUT VICRYL 4-0 RB1 27IN UD

## (undated) DEVICE — STERI-DRAPE 22 X 26

## (undated) DEVICE — SEE MEDLINE ITEM 146373

## (undated) DEVICE — SYR 10CC LUER LOCK

## (undated) DEVICE — NDL HYPO 27G X 1 1/2

## (undated) DEVICE — KIT DRESS GLASSCK EAR ADLT ST

## (undated) DEVICE — ALCOHOL 70% ISOP RUBBING 4OZ

## (undated) DEVICE — CUP MEDICINE STERILE 2OZ

## (undated) DEVICE — ADHESIVE DERMABOND ADVANCED

## (undated) DEVICE — DRAPE STERI-DRAPE 1000 17X11IN

## (undated) DEVICE — ADHESIVE MASTISOL VIAL 48/BX

## (undated) DEVICE — CORD BIPOLAR 12 FOOT